# Patient Record
Sex: MALE | Race: WHITE | Employment: FULL TIME | URBAN - METROPOLITAN AREA
[De-identification: names, ages, dates, MRNs, and addresses within clinical notes are randomized per-mention and may not be internally consistent; named-entity substitution may affect disease eponyms.]

---

## 2021-03-06 ENCOUNTER — HOSPITAL ENCOUNTER (EMERGENCY)
Age: 21
Discharge: HOME | End: 2021-03-06
Attending: EMERGENCY MEDICINE
Payer: MEDICAID

## 2021-03-06 VITALS
HEIGHT: 68 IN | DIASTOLIC BLOOD PRESSURE: 80 MMHG | HEART RATE: 86 BPM | BODY MASS INDEX: 22.73 KG/M2 | SYSTOLIC BLOOD PRESSURE: 132 MMHG | TEMPERATURE: 97.8 F | RESPIRATION RATE: 15 BRPM | OXYGEN SATURATION: 97 % | WEIGHT: 150 LBS

## 2021-03-06 DIAGNOSIS — F43.20 ADJUSTMENT DISORDER, UNSPECIFIED TYPE: Primary | ICD-10-CM

## 2021-03-06 PROCEDURE — 99285 EMERGENCY DEPT VISIT HI MDM: CPT

## 2021-03-06 ASSESSMENT — ENCOUNTER SYMPTOMS
ABDOMINAL PAIN: 0
EYE DISCHARGE: 0
RHINORRHEA: 0
VOMITING: 0
BACK PAIN: 0
NAUSEA: 0
EYE PAIN: 0
SHORTNESS OF BREATH: 0
COUGH: 0
SORE THROAT: 0

## 2021-03-06 NOTE — ED PROVIDER NOTES
[address] ENCOUNTER      Pt Name: Azalea James  MRN: [id number]  [place] 2000  Date of evaluation: 3/6/2021  Provider: Sonu Villanueva MD    CHIEF COMPLAINT       Chief Complaint   Patient presents with   [address] Problem    Suicidal         HISTORY OF PRESENT ILLNESS      Philippe Cee is a 24 y.o. male who presents to the emergency department  for   Chief Complaint   Patient presents with   [address] Problem    Suicidal       27-year-old male presents for evaluation after having made concerning statements to police. He endorses that he drinks several alcoholic drinks around 6 or 7 PM.  He reports that he was driving home when he experienced some car trouble so pulled over to the side of the road. He states that police then pulled over and he was being cited for driving while intoxicated. He states that he did make statements about wanting to harm himself. He reports that he said these to avoid going to custodial. He was brought to the emergency department a pink slip. According to the pink slip, patient was detained by police due to concerns for them. Driving and while with police he made statements about wanting to harm himself and that he had an unstable home life. There is no report that he taken any actions with self-harm. In the emergency department, he currently denies any suicidal ideation homicidality or lethality. He denies any auditory visual loose Nations. He denies any history of mood disorder. Denies any history of suicide attempt. He states that he simply said these things in order to avoid going to custodial. He has no external signs of trauma. Denies any somatic complaints. He is alert and oriented answering questions appropriately. GCS of 15. Is moving all extremities spontaneously. Nursing Notes, Triage Notes & Vital Signs were reviewed.       REVIEW OF SYSTEMS    (2-9 systems for level 4, 10 or more for level 5)     Review of Systems   Constitutional: Negative for chills and fever. HENT: Negative for congestion, rhinorrhea and sore throat. Eyes: Negative for pain and discharge. Respiratory: Negative for cough and shortness of breath. Cardiovascular: Negative for chest pain and palpitations. Gastrointestinal: Negative for abdominal pain, nausea and vomiting. Endocrine: Negative for polydipsia and polyuria. Genitourinary: Negative for dysuria and flank pain. Musculoskeletal: Negative for back pain and neck pain. Skin: Negative for pallor and wound. Neurological: Negative for dizziness, facial asymmetry, light-headedness, numbness and headaches. Psychiatric/Behavioral: Negative for confusion. Except as noted above the remainder of the review of systems was reviewed and negative. PAST MEDICAL HISTORY   History reviewed. No pertinent past medical history. Prior to Admission medications    Medication Sig Start Date End Date Taking? Authorizing Provider   ibuprofen (ADVIL;MOTRIN) 600 MG tablet Take 1 tablet by mouth every 6 hours as needed for Pain 10/12/15   JOSEPHINE Duong CNP        There is no problem list on file for this patient. SURGICAL HISTORY       Past Surgical History:   Procedure Laterality Date    EYE SURGERY      HERNIA REPAIR           CURRENT MEDICATIONS       Discharge Medication List as of 3/6/2021  7:44 AM      CONTINUE these medications which have NOT CHANGED    Details   ibuprofen (ADVIL;MOTRIN) 600 MG tablet Take 1 tablet by mouth every 6 hours as needed for Pain, Disp-30 tablet, R-0             ALLERGIES     Patient has no known allergies. FAMILY HISTORY     History reviewed. No pertinent family history.        SOCIAL HISTORY       Social History     Socioeconomic History    Marital status: Single     Spouse name: None    Number of children: None    Years of education: None    Highest education level: None   Occupational History    None   Social Needs    Financial resource strain: None    Food insecurity     Worry: None     Inability: None    Transportation needs     Medical: None     Non-medical: None   Tobacco Use    Smoking status: Never Smoker    Smokeless tobacco: Never Used   Substance and Sexual Activity    Alcohol use: Yes    Drug use: No    Sexual activity: None   Lifestyle    Physical activity     Days per week: None     Minutes per session: None    Stress: None   Relationships    Social connections     Talks on phone: None     Gets together: None     Attends Zoroastrianism service: None     Active member of club or organization: None     Attends meetings of clubs or organizations: None     Relationship status: None    Intimate partner violence     Fear of current or ex partner: None     Emotionally abused: None     Physically abused: None     Forced sexual activity: None   Other Topics Concern    None   Social History Narrative    None       SCREENINGS    Hugo Coma Scale  Eye Opening: Spontaneous  Best Verbal Response: Oriented  Best Motor Response: Obeys commands  [place] Coma Scale Score: 15          PHYSICAL EXAM    (up to 7 for level 4, 8 or more for level 5)     ED Triage Vitals [03/06/21 0527]   BP Temp Temp Source Pulse Resp SpO2 Height Weight   132/80 97.8 °F (36.6 °C) Oral 86 15 92 % 5' 8\" (1.727 m) 150 lb (68 kg)       Physical Exam  Vitals signs reviewed. Constitutional:       Appearance: He is not ill-appearing or toxic-appearing. HENT:      Head: Normocephalic and atraumatic. Nose: No congestion or rhinorrhea. Mouth/Throat:      Mouth: Mucous membranes are moist.      Pharynx: No oropharyngeal exudate or posterior oropharyngeal erythema. Eyes:      General:         Right eye: No discharge. Left eye: No discharge. Extraocular Movements: Extraocular movements intact. Pupils: Pupils are equal, round, and reactive to light.    Neck: Musculoskeletal: Normal range of motion and neck supple. No muscular tenderness. Cardiovascular:      Rate and Rhythm: Normal rate. Heart sounds: No friction rub. No gallop. Pulmonary:      Effort: Pulmonary effort is normal. No respiratory distress. Comments: Respirations unlabored  No retractions, no increased work of breathing  Chest:      Chest wall: No tenderness. Abdominal:      Palpations: Abdomen is soft. Tenderness: There is no abdominal tenderness. There is no guarding. Comments: Abdomen soft, non-tender, non-peritoneal  No guarding on abdominal exam   Musculoskeletal:         General: No signs of injury. Right lower leg: No edema. Left lower leg: No edema. Skin:     General: Skin is warm. Capillary Refill: Capillary refill takes less than 2 seconds. Findings: No erythema, lesion or rash. Neurological:      General: No focal deficit present. Mental Status: He is alert and oriented to person, place, and time. DIAGNOSTIC RESULTS     Labs Reviewed - No data to display         RADIOLOGY:     Non-plain film images such as CT, Ultrasound and MRI are read by the radiologist. Plain radiographic images are visualized and preliminarily interpreted by the emergency physician. Interpretation per the Radiologist below, if available at the time of this note:    No orders to display         ED BEDSIDE ULTRASOUND:   Performed by ED Physician Tay Davis MD       LABS:  Labs Reviewed - No data to display    All other labs were within normal range or not returned as of this dictation.     EMERGENCY DEPARTMENT COURSE and DIFFERENTIAL DIAGNOSIS/MDM:   Vitals:    Vitals:    03/06/21 0527 03/06/21 0528   BP: 132/80    Pulse: 86    Resp: 15    Temp: 97.8 °F (36.6 °C)    TempSrc: Oral    SpO2: 92% 97%   Weight: 150 lb (68 kg)    Height: 5' 8\" (1.727 m)            MDM  Number of Diagnoses or Management Options  Adjustment disorder, unspecified type  Diagnosis management comments: 40-year-old male presents for evaluation after having made concerning statements to police. He did have some alcoholic beverages yesterday evening around 6 or 7 PM he reports. He states that he was driving and had some car trouble and was pulled over by police. He was being reportedly cited for an impaired driving violation when he made statements that he wanted to hurt himself or did not want a live and that he had unstable home life. He was brought to the emergency department on a pink slip. There is no report of any actions taken toward self-harm. In the emergency department, he vehemently denies any active suicidality, homicidality of the lethality. He reports that he made the statements because he was trying to avoid [facility]. He has no history of mood disorder. He is not on any psychotropic medications. No history of suicide attempts. He has no external signs of trauma. He is alert and oriented answering questions appropriately. He does appear to be clinically sober. His vitals unremarkable. Neurological and physical exams are nonacute. At this time, I do not see any significantly high risk aspects to patient's presentation. He has no history of suicide attempt. He is not taking actions with self-harm nor has any plan. I do believe that his statements were made in the context of trying to avoid going to [facility]. He is able to contract for safety at this time. He is counseled about avoiding such behaviors. He is able to find a sober ride. He is discharged with a sober ride. He will follow-up outpatient. Return precautions for worsening concerning symptoms are discussed. He is discharged in stable condition.          -  Patient seen and evaluated in the emergency department. -  Triage and nursing notes reviewed and incorporated. -  Old chart records reviewed and incorporated.   -  Work-up included:  See above  -  Results discussed with

## 2021-03-06 NOTE — ED NOTES
Report received from [facility] and care assumed at this time. Pt resting comfortably on cot. No needs expressed. Pt was given cell phone in order to obtain a ride home per Dr Juliette Johnson. Sitter at bedside.      Mac Pretty RN  03/06/21 5409

## 2021-03-06 NOTE — ED NOTES
Reviewed d/c instructions with pt and all questions addressed. Pt alert and oriented x4 at discharge. Pt seen getting into vehicle with brother.        Swetha Figueroa RN  03/06/21 9518

## 2021-03-06 NOTE — ED NOTES
Pt states his brother is currently on his way to  pt. Dr Rohit Auguste notified. Belongings given back to pt at this time in order to change.       Ronnell Briseno RN  03/06/21 5571

## 2021-03-06 NOTE — ED NOTES
Pt repeatedly stating \"this is ridiculous\" and \"my dad can just come pick me up right now\" This tech informed pt once again that he cannot leave due to pink slip.       Britany Hughes  03/06/21 4120

## 2022-01-01 ENCOUNTER — HOSPITAL ENCOUNTER (EMERGENCY)
Age: 22
Discharge: HOME | End: 2022-01-01
Attending: EMERGENCY MEDICINE
Payer: MEDICAID

## 2022-01-01 VITALS
WEIGHT: 150 LBS | TEMPERATURE: 97.5 F | HEART RATE: 80 BPM | RESPIRATION RATE: 16 BRPM | HEIGHT: 68 IN | OXYGEN SATURATION: 96 % | BODY MASS INDEX: 22.73 KG/M2 | SYSTOLIC BLOOD PRESSURE: 138 MMHG | DIASTOLIC BLOOD PRESSURE: 89 MMHG

## 2022-01-01 DIAGNOSIS — F10.920 ACUTE ALCOHOLIC INTOXICATION WITHOUT COMPLICATION (HCC): Primary | ICD-10-CM

## 2022-01-01 PROCEDURE — 99284 EMERGENCY DEPT VISIT MOD MDM: CPT

## 2022-01-01 NOTE — ED NOTES
Pt attempted to call Dad for a ride. Refusing to allow blood draw or to provide urine specimen.       Lexis Chirinos RN  01/01/22 9550

## 2022-01-01 NOTE — ED NOTES
Discharge instructions given while pt walking out door. Dad in car.       Flakita Garcia RN  01/01/22 8591

## 2022-01-01 NOTE — ED NOTES
Pt's dad called back will be here to  rosanne erickson [phone]     Tyree Hercules, ANN-MARIE  01/01/22 1547

## 2022-04-13 ENCOUNTER — HOSPITAL ENCOUNTER (OUTPATIENT)
Dept: PSYCHIATRY | Age: 22
Setting detail: THERAPIES SERIES
Discharge: HOME | End: 2022-04-13
Payer: MEDICAID

## 2022-04-13 PROCEDURE — 80305 DRUG TEST PRSMV DIR OPT OBS: CPT

## 2022-04-13 PROCEDURE — 90791 PSYCH DIAGNOSTIC EVALUATION: CPT

## 2022-04-13 ASSESSMENT — ANXIETY QUESTIONNAIRES
3. WORRYING TOO MUCH ABOUT DIFFERENT THINGS: 0
6. BECOMING EASILY ANNOYED OR IRRITABLE: 0
7. FEELING AFRAID AS IF SOMETHING AWFUL MIGHT HAPPEN: 1
IF YOU CHECKED OFF ANY PROBLEMS ON THIS QUESTIONNAIRE, HOW DIFFICULT HAVE THESE PROBLEMS MADE IT FOR YOU TO DO YOUR WORK, TAKE CARE OF THINGS AT HOME, OR GET ALONG WITH OTHER PEOPLE: SOMEWHAT DIFFICULT
6. BECOMING EASILY ANNOYED OR IRRITABLE: 0
1. FEELING NERVOUS, ANXIOUS, OR ON EDGE: 1
4. TROUBLE RELAXING: 0
7. FEELING AFRAID AS IF SOMETHING AWFUL MIGHT HAPPEN: 1
GAD7 TOTAL SCORE: 3
2. NOT BEING ABLE TO STOP OR CONTROL WORRYING: 1
2. NOT BEING ABLE TO STOP OR CONTROL WORRYING: 1
GAD7 TOTAL SCORE: 3
3. WORRYING TOO MUCH ABOUT DIFFERENT THINGS: 0
5. BEING SO RESTLESS THAT IT IS HARD TO SIT STILL: 0
5. BEING SO RESTLESS THAT IT IS HARD TO SIT STILL: 0
4. TROUBLE RELAXING: 0
1. FEELING NERVOUS, ANXIOUS, OR ON EDGE: 1
IF YOU CHECKED OFF ANY PROBLEMS ON THIS QUESTIONNAIRE, HOW DIFFICULT HAVE THESE PROBLEMS MADE IT FOR YOU TO DO YOUR WORK, TAKE CARE OF THINGS AT HOME, OR GET ALONG WITH OTHER PEOPLE: SOMEWHAT DIFFICULT

## 2022-04-13 ASSESSMENT — LIFESTYLE VARIABLES: HISTORY_ALCOHOL_USE: YES

## 2022-04-13 NOTE — PROGRESS NOTES
[facility]                Progress Note    [] Igor  [] Jordyn sorto                    Patient Name: Katlyn Doherty   : 2000     Case # :  [id number]  Therapist: Zelalem Dimas [facility]        Objective/Service/Time:    Asmt 1 Hour UDS . 28  S- Client shared legal, use, and family histories. See in [address] Munoz Denies any MH, oriented and cooperated with the process, Client understands treatment program  A- Client and this writer completed assessment, LYNN and UDS  P- Client to attend treatment planning next week and Thursday am group next week.                   Zelalem Dimas MA, LPC, LSW, MAC 22, 2:57 PM

## 2022-04-13 NOTE — PROGRESS NOTES
[facility]                     CLINICAL DIAGNOSIS SUMMARY    Location: [] [place] [x] Sindy Colon                   Patient Name: Dagoberto Snyder   : 2000     Case # :  [id number]  Therapist: Ximena Bergman LPC      1. Identifying information:  Dagoberto Snyder / 2000         WSM resides with father, Step Mother and Brother who has Mental Illness,  Client works FT [employer]    2. Substance use history:  F10.20 Other and unspecified alcohol dependence/unspecified drinking behavior       Abuse of alcohol age 25 4 to 5 beers  Last use 2022, no withdraws    3. Consequences of substance use: (personal, inter-personal, legal, occupational, medical, nutritional,       Leisure, spiritual, etc.)               Davee Locket, ,   Physical Control, 2022, [place],  Probation Violation 2022 [address]    4. Co-existing problems;  (mental health, psychiatric, previous treatment programs, family       Problems, social, educational, etc.)       Denies any MH,  Estranged mother has abused drugs, Older brother Severe Mental Illness    5. Treatment needs, barriers to treatment, impact of disease on life:      Transportation    Summary of Medical History:  Prior to Admission medications    Medication Sig Start Date End Date Taking? Authorizing Provider   ibuprofen (ADVIL;MOTRIN) 600 MG tablet Take 1 tablet by mouth every 6 hours as needed for Pain 10/12/15   JOSEPHINE Perez - CNP     Past Surgical History:   Procedure Laterality Date    EYE SURGERY      HERNIA REPAIR       History reviewed. No pertinent past medical history. There are no problems to display for this patient. 6. Level of Care Determination:      1 Outpatient Services   7. Treatment available      _x___yes   _____no         8.   Name of program referred:    ___x_[facility],    ____[facility],       _______other/identify     Electronically signed by Ximena Bergman LPC on 2022 at 2:22 PM

## 2022-04-13 NOTE — PROGRESS NOTES
[address] [place] [address] Urinalysis Laboratory Testing and Medical History Physician Order       Location: [] [place] [x] Galina Alvarez MD., [address], Medical Director of [facility] SURGICAL [facility] Director orders for [address] [place] [address] clinical therapists to collect an urine sample from the below patient,  and medical order for placement in level of care documented on [facility] 157 scanned order. Client: Dagoberto Snyder   : 2000   Case# [id number]    Urine sample will be collected following the collections guidelines provided on Clinical Reference Laboratory [facility] AT [facility] custody form, and completion of the [facility] Non-Federal chain of custody drug screening form. During the course of treatment, randomly a urine sample will be collected, at a minimum of one time a month, more frequently as needed, as part of the clinical outpatient alcohol and drug treatment program at [address] [place] [address]. Medical care recommendation for clients experiencing/reporting  medical concerns, who do not have a family physician and willing to attend  medical care will be assisted in seeking medical care. Clinical providers will refer clients to local family physicians practices as part of the  clients treatment plan and assist the client in gaining access to an appointment. Release of information will be requested to support the  clients seeking medical care. Summary of Medical History  Prior to Admission medications    Medication Sig Start Date End Date Taking? Authorizing Provider   ibuprofen (ADVIL;MOTRIN) 600 MG tablet Take 1 tablet by mouth every 6 hours as needed for Pain 10/12/15   JOSEHPINE Perez - CNP     Past Surgical History:   Procedure Laterality Date    EYE SURGERY      HERNIA REPAIR       History reviewed. No pertinent past medical history. There are no problems to display for this patient.       Ximena Bergman [facility]  2022/2:00 PM

## 2022-04-20 ENCOUNTER — HOSPITAL ENCOUNTER (OUTPATIENT)
Dept: PSYCHIATRY | Age: 22
Setting detail: THERAPIES SERIES
Discharge: HOME | End: 2022-04-20
Payer: MEDICAID

## 2022-04-20 PROCEDURE — 90834 PSYTX W PT 45 MINUTES: CPT

## 2022-04-20 NOTE — PROGRESS NOTES
[address] [facility] TREATMENT PLAN      Location: [] [place] [x] Jordyn sorto    Treatment plan: Initial    Strengths: family and work focused    Weakness/Limitations: legal and abusing alcohol    Service/Frequency/Duration: Wkly IT and GT, Random UDS    Diagnosis: F10.20 Other and unspecified alcohol dependence/unspecified drinking behavior    Level of Care: 1 Outpatient Services    1. Problem: Client abuse of Alcohol SHE[place] in 2020, Physical Control in 2022, abuses 4 to 5 beers at a time for 4 years. a. Goal: Maintain Sober living in 90 days  b. Objectives:   i. 1) Client to identify 2 triggers and cravings of use, complete treatment booklet in 90 days Evaluation Date: 7/20/22 Code: C Continue TBD  ii. 2) Client participate in positive peer activity 1 x weekly, [facility] Evaluation Date: 7/20/22Code: C Continue TBD      2. Problem: Client coping with stressors such as brother's Mental illness  a. Goal: Develop a coping plan in 90 days  b. Objectives:   i. 1) Learn and discuss 4 coping skills in 90 days  Evaluation Date: 7/20/22Code: C Continue TBD   ii. 2) Discuss resources, online such as AMI in 90 days  Evaluation Date:7/20/22Code: C Continue TBD       3. Problem: Current probation [address] for Physical Control and Probation Violation  a. Goal: Meet the expectations of the Court and Probation Officier in 90 days   b. Objectives:   i. 1) Attend all meetings, including drug court and pay all sanctions in 90 days  Evaluation Date: 7/20/22 Code: C Continue TBD      Defer:explore educational and vocational opportunites    Discharge Plan/Instructions: Maintain sober living, occupation and use coping skills manage family issues    Philippe Goldstein / 2000 has participated in the treatment plan development outlined above on 4/20/2022.      Ximena Bergman LPC  4/20/2022/10:18 AM

## 2022-04-21 ENCOUNTER — HOSPITAL ENCOUNTER (OUTPATIENT)
Dept: PSYCHIATRY | Age: 22
Setting detail: THERAPIES SERIES
Discharge: HOME | End: 2022-04-21
Payer: MEDICAID

## 2022-04-21 PROCEDURE — 90853 GROUP PSYCHOTHERAPY: CPT

## 2022-04-21 NOTE — GROUP NOTE
[address] [facility] Group Therapy Note      4/21/2022    Location:  ID90T    Clients Presents: [phone]     Clients Absent: [phone]    Length of session: 1.5 hours    Group Note: OP    Group Type: Co-Ed    New members were welcomed and introduced. Norms and expectations of group were discussed. Content: Client's discussed corrective and alternative thinking and communication as a sober person. Client's discussed Princess Long inspirational talk. Alec Hanson  4/21/2022 12:01 PM    Co-Therapist: N/A      [facility] Individual Group Progress Note    Philippe Goldstein  2000 4/21/2022    Notes on Client Progress in Group    Client shared about family and empowerment in his initial group, denies abusing alcohol currently.      Alec Hanson  4/21/2022 12:04 PM    Co-Therapist: N/A

## 2022-04-27 ENCOUNTER — HOSPITAL ENCOUNTER (OUTPATIENT)
Dept: PSYCHIATRY | Age: 22
Setting detail: THERAPIES SERIES
Discharge: HOME | End: 2022-04-27
Payer: MEDICAID

## 2022-04-27 PROCEDURE — 90834 PSYTX W PT 45 MINUTES: CPT

## 2022-04-27 NOTE — PROGRESS NOTES
[facility]                Progress Note    [] Igor  [x] Christi Wright                    Patient Name: Gino Chapman   : 2000     Case # :  [id number]  Therapist: Clair Be [facility]        Objective/Service/Time:    IT 1    Topic:self will vs surrendering  treatment book  S- Client reports, exploring jobs, working making pizza, going to drug court and writing letters to his brother. O-Client reports, motivation, setting goals, full range affect      A-Client began discussion on First Step Powerlessness vs self will        P-Client attends [employer], work, drug court and has supportive family.                   Clari Be MA, LPC, LSW, MAC 22, 2:01 PM

## 2022-04-28 ENCOUNTER — HOSPITAL ENCOUNTER (OUTPATIENT)
Dept: PSYCHIATRY | Age: 22
Setting detail: THERAPIES SERIES
Discharge: HOME | End: 2022-04-28
Payer: MEDICAID

## 2022-04-28 PROCEDURE — 90853 GROUP PSYCHOTHERAPY: CPT

## 2022-04-28 NOTE — GROUP NOTE
[address] [facility] Group Therapy Note      4/28/2022    Location:  [place]    Clients Presents: [phone]    Clients Absent: [phone]    Length of session: 1.5 hours    Group Note: Alexia Ramsay    Group Type: Coed    New members were welcomed and introduced. Norms and expectations of group were discussed. Content: Client discussed motivation, failing forward, lessons learned in Recovery. Clients discussed Unmanageability. Max Mendoza [facility]  4/28/2022 11:53 AM    Co-Therapist:       [facility] Individual Group Progress Note    Philippe Goldstein  2000 4/28/2022    Notes on Client Progress in Group    Client shared about his life of drinking and no direction, currently explore goals and working out.     Max Mendoza [facility]  4/28/2022 11:58 AM    Co-Therapist: N/A

## 2022-05-04 ENCOUNTER — HOSPITAL ENCOUNTER (OUTPATIENT)
Dept: PSYCHIATRY | Age: 22
Setting detail: THERAPIES SERIES
Discharge: HOME | End: 2022-05-04
Payer: MEDICAID

## 2022-05-04 PROCEDURE — 90834 PSYTX W PT 45 MINUTES: CPT

## 2022-05-04 NOTE — PROGRESS NOTES
[facility]                Progress Note    [] Igor  [x] Jordyn sorto                    Patient Name: Raymond Pedersen   : 2000     Case # :  [id number]  Therapist: Alec Morejon        Objective/Service/Time:    IT-   1.0  Topic: First Step Book:    PP 6-9 Image of Powerlessness, facts about abusing, Thinking about drinking   S- Client continues in drug court weekly, working 6 days, and hoping to find another job that will compliment it. 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 O- Client motivated, anxious to get driving privileges  A- Discussed and brainstorming avocational opportunities, Discussed First Step and discussed application to his abuse and current sober living. P- Apply skills learned, Attends Drug Court, works daily, and attends [facility] [employer] [facility].                        Kaylen Morel MA, [place], LSW, MAC 22, 1:48 PM

## 2022-05-05 ENCOUNTER — HOSPITAL ENCOUNTER (OUTPATIENT)
Dept: PSYCHIATRY | Age: 22
Setting detail: THERAPIES SERIES
Discharge: HOME | End: 2022-05-05
Payer: MEDICAID

## 2022-05-05 PROCEDURE — 90853 GROUP PSYCHOTHERAPY: CPT

## 2022-05-05 NOTE — GROUP NOTE
[address] [facility] Group Therapy Note      5/5/2022    Location:  [place]    Clients Presents: [phone]    Clients Absent: [phone]    Length of session: 1.5 hours    Group Note: OP    Group Type: Co-Ed    New members were welcomed and introduced. Norms and expectations of group were discussed. Content: Client's discussed SUDS (decisions to stay sober),  Protective Factors (coping) and Lessons learn from [facility]  5/5/2022 11:39 AM    Co-Therapist: N/A      [facility] Individual Group Progress Note    Philippe Goldstein  2000 5/5/2022    Notes on Client Progress in Group    Client looking for a complimentary job, family focused, staying sober and communicating & admiring brother who is in Basic training.     [facility]  5/5/2022 11:59 AM    Co-Therapist: N/A

## 2022-05-11 ENCOUNTER — HOSPITAL ENCOUNTER (OUTPATIENT)
Dept: PSYCHIATRY | Age: 22
Setting detail: THERAPIES SERIES
Discharge: HOME | End: 2022-05-11
Payer: MEDICAID

## 2022-05-11 PROCEDURE — 90834 PSYTX W PT 45 MINUTES: CPT

## 2022-05-11 PROCEDURE — 80305 DRUG TEST PRSMV DIR OPT OBS: CPT

## 2022-05-11 NOTE — PROGRESS NOTES
[facility]                Progress Note    [] [place]  [x] [facility]                    Patient Name: Kanika Bryant   : 2000     Case # :  [id number]  [facility]        Objective/Service/Time:    IT-1.0   UDS .35  First Step-trying to control or cut down, 3/1 Had court yesterday case in [address] [place] [address] reports, working, staying focused, supporting father with help, drug court and attending hearing for his [address] in Feb.  Linda Ee- concerned about his hearing, focused, cooperative, denies use  A- Client and this writer spoke about [address] in Feb, client wanting to know a 5 day inpatient program. Client denies a need for higher level care, this writer advised, per his focus on our program, Client does not meet the criteria. Client discussed losses with this writer in First Step treatment book. P- Client attends drug court, GRP, IT, works and helps his father.                   Max Mendoza MA, LPC, LSW, MAC 22, 1:48 PM

## 2022-05-12 ENCOUNTER — HOSPITAL ENCOUNTER (OUTPATIENT)
Dept: PSYCHIATRY | Age: 22
Setting detail: THERAPIES SERIES
Discharge: HOME | End: 2022-05-12
Payer: MEDICAID

## 2022-05-12 PROCEDURE — 90853 GROUP PSYCHOTHERAPY: CPT

## 2022-05-12 NOTE — GROUP NOTE
[address] [facility] Group Therapy Note      5/12/2022    Location:  [employer]    Clients Presents: [phone]    Clients Absent: [phone]     Length of session: 1.5 hours    Group Note: OP    Group Type: Co-Ed    New members were welcomed and introduced. Norms and expectations of group were discussed. Content: Client's discussed logical, Son and Emotional mind in relationship to sober living and conflicts/stressors. [facility]  5/12/2022 11:32 AM    Co-Therapist: N/A      [facility] Individual Group Progress Note    Philippe Goldstein  2000 5/12/2022    Notes on Client Progress in Group    Client reports working through shame of his offenses, and current legal stressors. Client was focused and denies current use.     [facility]  5/12/2022 11:35 AM    Co-Therapist: N/A

## 2022-05-18 ENCOUNTER — HOSPITAL ENCOUNTER (OUTPATIENT)
Dept: PSYCHIATRY | Age: 22
Setting detail: THERAPIES SERIES
Discharge: HOME | End: 2022-05-18
Payer: MEDICAID

## 2022-05-18 PROCEDURE — 90834 PSYTX W PT 45 MINUTES: CPT

## 2022-05-18 NOTE — PROGRESS NOTES
[facility] JARED                Progress Note    [] Igor  [x] Jordyn sorto                    Patient Name: Jailene Newman   : 2000     Case # :  [id number]  Therapist: Alec Taveras        Objective/Service/Time:    IT 1.0 Topic: Coping with Procrastination when abusing alcohol, Damage to the Body   S- Client reports,  [facility]. Client has court in [place] on Tuesday. Client continues to work and [employer] 6 days a week. Client continues in Drug Court. O- Client motivated, focused and willingly per his report  A- Client and this writer discussed court hearing and options for 6 day intervention. Client and this writer discussed Damage to the Body,  Abusing alcohol. P- Client will attend GT, Client participates in Drug Court and Work. Client encouraged to work out. Client utilizing legal supports.                   Evelyn Hatch MA, [place], LSW, MAC 22, 1:47 PM

## 2022-05-19 ENCOUNTER — HOSPITAL ENCOUNTER (OUTPATIENT)
Dept: PSYCHIATRY | Age: 22
Setting detail: THERAPIES SERIES
Discharge: HOME | End: 2022-05-19
Payer: MEDICAID

## 2022-05-19 PROCEDURE — 90853 GROUP PSYCHOTHERAPY: CPT

## 2022-05-19 NOTE — GROUP NOTE
612 [facility] Group Therapy Note      5/19/2022    Location:  [employer]    Clients Presents: [phone]    Clients Absent:     Length of session: 1.5 hours    Group Note: OP    Group Type: Co-Ed    New members were welcomed and introduced. Norms and expectations of group were discussed. Content: Client's discussed Micro Habits, change and watched and discussed motivational illustration on [employer] (courage to be sober and setting boundaries & cognizant of people places and things    Tanmay [facility]  5/19/2022 11:27 AM    Co-Therapist: N/A      [facility] Group Progress Note    Philippe Goldstein  2000 5/19/2022    Notes on Client Progress in Group    Client continues to be motivated and setting goals. Client wants to get driving privileges. Client hopes to attend brothers graduation in [place]. Client denies use.   [facility]  5/19/2022 11:30 AM    Co-Therapist: N/A

## 2022-05-25 ENCOUNTER — HOSPITAL ENCOUNTER (OUTPATIENT)
Dept: PSYCHIATRY | Age: 22
Setting detail: THERAPIES SERIES
Discharge: HOME | End: 2022-05-25
Payer: MEDICAID

## 2022-05-25 PROCEDURE — 90834 PSYTX W PT 45 MINUTES: CPT

## 2022-05-25 NOTE — PROGRESS NOTES
[facility]                Progress Note    [] [place]  [x] Jordyn sorto                    Patient Name: Diamante Johnson   : 2000     Case # :  [id number]  Therapist: Alec Hanson        Objective/Service/Time:    IT 1.0  Topic: 3/1 Legal  S- Client was sentenced in [address] (),  Sentenced to GPS 15 days,  6 day AIP program, 5 years DL suspension, 1 year probation and complete [employer]. Client has 700 fine for [place]. O- Client motivated,  Concerned and engaged  A- Client planning to go to [place] next Week. Client processing issues with sentencing, work future and suspended license. Client motivated and using micro-actions, planning to pay fines. Client will clarify with court if he can travel with court GPS. P- IT, GT, Client travelling next week to [place], cancelling appointments.                   Madhavi Pedraza MA, [place], LSW, MAC 22, 2:02 PM

## 2022-05-26 ENCOUNTER — HOSPITAL ENCOUNTER (OUTPATIENT)
Dept: PSYCHIATRY | Age: 22
Setting detail: THERAPIES SERIES
Discharge: HOME | End: 2022-05-26
Payer: MEDICAID

## 2022-05-26 PROCEDURE — 90853 GROUP PSYCHOTHERAPY: CPT

## 2022-05-26 NOTE — GROUP NOTE
612 [facility] Group Therapy Note      5/26/2022    Location:  [email or web address]    Clients Presents: [phone]    Clients Absent:     Length of session: 1.5 hours    Group Note: OP    Group Type: Co-Ed    New members were welcomed and introduced. Norms and expectations of group were discussed. Content: Clients shared their substance use stories, discussed CBT tool & strategies for distressing times and discussed Father Nati Brunner Step 1 presentation. [facility]  5/26/2022 11:27 AM    Co-Therapist: N/A      [facility] Progress Note    Philippe Goldstein  2000 5/26/2022    Notes on Client Progress in Group    Client share about abusing alcohol and legal problems. Client maintain sober living on house arrest, has permission to travel to brother's graduation from [employer]. Client denies use.     [facility]  5/26/2022 11:29 AM    Co-Therapist: N/A

## 2022-06-02 ENCOUNTER — HOSPITAL ENCOUNTER (OUTPATIENT)
Dept: PSYCHIATRY | Age: 22
Setting detail: THERAPIES SERIES
Discharge: HOME | End: 2022-06-02
Payer: MEDICAID

## 2022-06-02 NOTE — GROUP NOTE
612 [facility] Group Therapy Note      6/2/2022    Location:  [place]    Clients Presents: [phone]    Clients Absent: [phone]    Length of session: 1.5 hours    Group Note: OP    Group Type: Co-Ed    New members were welcomed and introduced. Norms and expectations of group were discussed.     Content: Client's participated and reviewed the new Check in, Clients learn coping tips from Smart Recovery and CBT on Anxiety and coping    [facility]  6/2/2022 11:25 AM    Co-Therapist: N/A      [facility] Progress Note    Philippe Goldstein  2000 6/2/2022    Notes on Client Progress in Group    Reason for Absence: Cxl in 911 [facility]  6/2/2022 11:28 AM    Co-Therapist: N/A

## 2022-06-08 ENCOUNTER — HOSPITAL ENCOUNTER (OUTPATIENT)
Dept: PSYCHIATRY | Age: 22
Setting detail: THERAPIES SERIES
Discharge: HOME | End: 2022-06-08
Payer: MEDICAID

## 2022-06-08 PROCEDURE — 90834 PSYTX W PT 45 MINUTES: CPT

## 2022-06-08 NOTE — PROGRESS NOTES
[facility] JARED                Progress Note    [] [place]  [x] Jordyn sorto                    Patient Name: Gregory Arauz   : 2000     Case # : [id number]  Therapist: Beatris Ash [facility]        Objective/Service/Time:    IT 1.0           Topic: treatment book 3/1 completed house arrest  S- Client was not able to attend [employer] graduation of brothers due to restrictions from house arrest.  Client verbalized disappointment. Client continues drug court, working with Leonardo Li and working with his father. Client completed house arrest with no incidents. O- Client reports disappointment, motivation, needing to schedule 6 day intervention course  A- Client processed disappointments, Client shared information from his treatment book on areas of unamanageability. P- client attends Drug Court, has probation, works, and hopes to work more with his father.                   Beatris Ash MA, [place], LSW, MAC 22, 2:02 PM

## 2022-06-09 ENCOUNTER — HOSPITAL ENCOUNTER (OUTPATIENT)
Dept: PSYCHIATRY | Age: 22
Setting detail: THERAPIES SERIES
Discharge: HOME | End: 2022-06-09
Payer: MEDICAID

## 2022-06-09 PROCEDURE — 90853 GROUP PSYCHOTHERAPY: CPT

## 2022-06-09 NOTE — GROUP NOTE
612 [facility] Group Therapy Note      6/9/2022    Location:  [place]    Clients Presents: [phone]    Clients Absent:     Length of session: 1.5 hours    Group Note: OP    Group Type: Co-Ed    New members were welcomed and introduced. Norms and expectations of group were discussed. Content: Client's discussed coping with triggers and urges, Clients discussed Transformational Change. Clients reviewed group goals and new check in. [facility]  6/9/2022 12:00 PM    Co-Therapist: N/A      [facility] Progress Note    Philippe Goldstein  2000 6/9/2022    Notes on Client Progress in Group    Client shared about coping with anger, his sober living and diversions.     [facility]  6/9/2022 12:03 PM    Co-Therapist: N/A

## 2022-06-15 ENCOUNTER — HOSPITAL ENCOUNTER (OUTPATIENT)
Dept: PSYCHIATRY | Age: 22
Setting detail: THERAPIES SERIES
Discharge: HOME | End: 2022-06-15
Payer: MEDICAID

## 2022-06-15 PROCEDURE — 80305 DRUG TEST PRSMV DIR OPT OBS: CPT

## 2022-06-15 PROCEDURE — 90834 PSYTX W PT 45 MINUTES: CPT

## 2022-06-15 NOTE — PROGRESS NOTES
[facility]                Progress Note    [] Igor  [x] Eunice Gutierrez                    Patient Name: Yuridia Heading   : 2000     Case # : [id number]  The[facility]        Objective/Service/Time:    IT- I.0   UDS . 28 Topic: completed treatment book, first step  S- Client reports, working and family support. Client attends drug court. O- Client motivated, cooperative and denies use of alcohol  A- Discussed First Step on going against personal  Values, feelings of helplessness, belief in HP, and Continuing Recovery. Client reports awareness into his abuse of alcohol and it's outcomes. P- Client attends Drug Court, has a pending intervention Class and attends Reach IT and GT.                      Nita Lindo MA, [place], LSW, MAC 06/15/22, 2:01 PM

## 2022-06-16 ENCOUNTER — HOSPITAL ENCOUNTER (OUTPATIENT)
Dept: PSYCHIATRY | Age: 22
Setting detail: THERAPIES SERIES
Discharge: HOME | End: 2022-06-16
Payer: MEDICAID

## 2022-06-16 PROCEDURE — 90853 GROUP PSYCHOTHERAPY: CPT

## 2022-06-16 NOTE — GROUP NOTE
612 [facility] Group Therapy Note      6/16/2022    Location:  [email or web address]    Clients Presents: [phone]     Clients Absent:[phone]    Length of session: 1.5 hours    Group Note: OP    Group Type: Co-Ed    New members were welcomed and introduced. Norms and expectations of group were discussed. Content: Clients discussed new rules, CBT Disarming negative self talk and urges to use and discussed Neurotransmitters in relation to addiction. [facility]  6/16/2022 11:57 AM    Co-Therapist: N/A      [facility] Progress Note    Philippe Goldstein  2000 6/16/2022    Notes on Client Progress in Group    Client actively shared about his urges and reason why he abused and how he challenges urges today.     [facility]  6/16/2022 12:03 PM    Co-Therapist: N/A

## 2022-06-22 ENCOUNTER — HOSPITAL ENCOUNTER (OUTPATIENT)
Dept: PSYCHIATRY | Age: 22
Setting detail: THERAPIES SERIES
Discharge: HOME | End: 2022-06-22
Payer: MEDICAID

## 2022-06-22 PROCEDURE — 90834 PSYTX W PT 45 MINUTES: CPT

## 2022-06-22 NOTE — PROGRESS NOTES
[facility]                Progress Note    [] [place]  [x] Jordyn sorto                    Patient Name: Shital Koch   : 2000     Case # :  [id number]  Therapist: Alem Parra [facility]        Objective/Service/Time:    IT 1.0 Topic:  finding 6 day intervention 3/1 working with [address] [place] [address], [address] [place] [address] reports, difficulty finding 6 day DIP. 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 Client progressed in drug court. O- Aware of emotions, motivated, focused  A- Client and this writer discussed conflict management skills,  And researched alternative correctional programs meets expectation for Colt Acosta, 6 days. P- Client attend GT, IT weekly tentative completion in July, Client call [facility] and his PO. Client attends drug court every other week.                   Alem Parra MA, [place], LSW, MAC 22, 2:00 PM

## 2022-06-23 ENCOUNTER — HOSPITAL ENCOUNTER (OUTPATIENT)
Dept: PSYCHIATRY | Age: 22
Setting detail: THERAPIES SERIES
Discharge: HOME | End: 2022-06-23
Payer: MEDICAID

## 2022-06-23 PROCEDURE — 90853 GROUP PSYCHOTHERAPY: CPT

## 2022-06-23 NOTE — GROUP NOTE
612 [facility] Group Therapy Note      6/23/2022    Location:  [employer]    Clients Presents: [phone]    Clients Absent: [phone]    Length of session: 1.5 hours    Group Note: OP    Group Type: Co-Ed    New members were welcomed and introduced. Norms and expectations of group were discussed. Content: Client's discussed Above the Line thinking in relationship to Stinking Thinking and explored cravings & triggers in their use of drugs and alcohol    Claudean Mesa, [facility]  6/23/2022 12:00 PM    Co-Therapist: N/A      [facility] Progress Note    Phliippe Goldstein  2000 6/23/2022    Notes on Client Progress in Group  Client was attentive and shared freely, uses work and family. Client noted boredom was a trigger.     Claudean Mesa, [facility]  6/23/2022 12:04 PM    Co-Therapist: N/A

## 2022-06-30 ENCOUNTER — APPOINTMENT (OUTPATIENT)
Dept: PSYCHIATRY | Age: 22
End: 2022-06-30
Payer: MEDICAID

## 2022-06-30 ENCOUNTER — HOSPITAL ENCOUNTER (OUTPATIENT)
Dept: PSYCHIATRY | Age: 22
Setting detail: THERAPIES SERIES
Discharge: HOME | End: 2022-06-30
Payer: MEDICAID

## 2022-06-30 PROCEDURE — 90853 GROUP PSYCHOTHERAPY: CPT | Performed by: COUNSELOR
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
## 2022-07-06 ENCOUNTER — HOSPITAL ENCOUNTER (OUTPATIENT)
Dept: PSYCHIATRY | Age: 22
Setting detail: THERAPIES SERIES
Discharge: HOME | End: 2022-07-06
Payer: MEDICAID

## 2022-07-06 PROCEDURE — 90834 PSYTX W PT 45 MINUTES: CPT

## 2022-07-06 NOTE — PROGRESS NOTES
[facility]                Progress Note    [] [place]  [x] Jordyn sorto                    Patient Name: Moe Abrams   : 2000     Case # :  [id number]  Therapist: Enoc Reed [facility]        Objective/Service/Time:    Client reported in 1 Hour IT topic: compliance 3/1-2  Jimenez Porteous  Coping with Brother's Mental illness  S- Client reports his older brother currently having Mental Illness issues being evaluated. Client is always aware and concerned about his brother's behavior. Client has court next week, client signed LYNN for self so he can take his copies to the court. Client attempted to comply with their request of a 6 day DIP which there is only 3 day DIP. O- Aware of emotions, process well  A- Client wanted to work towards driving privileges so he can find a better job and move to his own place.   Client shares the frustration and powerlessness over his brother's MI.  P- Client received copies of progress reports to take to court with him, Client continues Drug Court, IT, and GT              Enoc Reed MA, [place], LSW, [facility] 22, 2:53 PM

## 2022-07-07 ENCOUNTER — HOSPITAL ENCOUNTER (OUTPATIENT)
Dept: PSYCHIATRY | Age: 22
Setting detail: THERAPIES SERIES
Discharge: HOME | End: 2022-07-07
Payer: MEDICAID

## 2022-07-07 PROCEDURE — 90853 GROUP PSYCHOTHERAPY: CPT

## 2022-07-07 NOTE — GROUP NOTE
612  Group Therapy Note      7/7/2022    Location:  [facility]    Clients Presents: [phone]    Clients Absent: [phone]    Length of session: 1.5 hours    Group Note: OP    Group Type: Co-Ed    New members were welcomed and introduced. Norms and expectations of group were discussed. Content: Clients discussed checked in, Protective Factors in sober living and discussed Jamal Kiss sober story. Raisa Santa [facility]  7/7/2022 11:59 AM    Co-Therapist: N/A      [facility] Progress Note    Philippe Goldstein  2000 7/7/2022    Notes on Client Progress in Group    Client cooperative, engaged in GT and focused on the agenda, denies use; admits needs to work on his sense of purpose.     Raisa Santa [facility]  7/7/2022 12:05 PM    Co-Therapist: N/A

## 2022-07-13 ENCOUNTER — HOSPITAL ENCOUNTER (OUTPATIENT)
Dept: PSYCHIATRY | Age: 22
Setting detail: THERAPIES SERIES
Discharge: HOME | End: 2022-07-13
Payer: MEDICAID

## 2022-07-13 PROCEDURE — 90834 PSYTX W PT 45 MINUTES: CPT

## 2022-07-13 NOTE — PROGRESS NOTES
[facility]                Progress Note    [] [place]  [x] Jordyn sorto                    Patient Name: Deshaun Gan   : 2000     Case # :  [id number]  [facility]        Objective/Service/Time:    IT 1.0  Topic:  Court update   Extended for 2 months,   Drug Court   S- Client reports,  Court review extended to Sept due to visiting . Client in drug court and working. Client working out at home. Client stated brother in inpatient due to his Mental Illness. O- Client oriented, focused, concerned about decisions  A- Client and this writer reviewed goals and discussed after care counselling till next hearing. P- Client attend GT, IT and drug court.                   Zuleyma Castro MA, [place], LSW, MAC 22, 2:16 PM

## 2022-07-14 ENCOUNTER — HOSPITAL ENCOUNTER (OUTPATIENT)
Dept: PSYCHIATRY | Age: 22
Setting detail: THERAPIES SERIES
Discharge: HOME | End: 2022-07-14
Payer: MEDICAID

## 2022-07-14 PROCEDURE — 90853 GROUP PSYCHOTHERAPY: CPT

## 2022-07-14 NOTE — GROUP NOTE
612 [facility] Group Therapy Note      7/14/2022    Location:  [facility]    Clients Presents: [phone]    Clients Absent:[phone]    Length of session: 1.5 hours    Group Note: OP    Group Type: Co-Ed    New members were welcomed and introduced. Norms and expectations of group were discussed. Content: Client's discussed their check in, [employer], AA and Virtues, The whole in the [place] [facility] story from [place]. [facility]  7/14/2022 11:54 AM    Co-Therapist: N/A      [facility] Progress Note    Philippe Goldstein  2000 7/14/2022    Notes on Client Progress in Group    Client attentive, focused, participates, denies use, Client shared his frustrations legal issues. Denies use.     [facility]  7/14/2022 11:59 AM    Co-Therapist: N/A

## 2022-07-20 ENCOUNTER — HOSPITAL ENCOUNTER (OUTPATIENT)
Dept: PSYCHIATRY | Age: 22
Setting detail: THERAPIES SERIES
Discharge: HOME | End: 2022-07-20
Payer: MEDICAID

## 2022-07-20 PROCEDURE — 80305 DRUG TEST PRSMV DIR OPT OBS: CPT

## 2022-07-20 PROCEDURE — 90834 PSYTX W PT 45 MINUTES: CPT

## 2022-07-20 NOTE — PROGRESS NOTES
[facility]                Progress Note    [] [place]  [x] Jordyn sorto                    Patient Name: Carrie Gordon   : 2000     Case # : [id number]  Therapist: Yun Goldman [facility]        Objective/Service/Time:    IT-1.0 UDS . 35  Topic: preparing for after care  and drug court   S- Client reports,   working, contemplating other jobs, attends drug court every other week, Client has review hearing in [employer] in Sept.  Client brainstorming jobs and motivations for his life. Client continual concerned for his brother who is back home from [employer] [facility]. Client proud of his brother in the [employer]. O-Client motivated, cooperative and focused on setting future goals. A-Client and this writer reviewed goals, and discussed final GT & Client will be involved after care after next week. P- Client attends drug court, working, working out, Reach GT on Thursday.                   Yun Goldman MA, [place], LSW, [facility] 22, 1:58 PM

## 2022-07-21 ENCOUNTER — HOSPITAL ENCOUNTER (OUTPATIENT)
Dept: PSYCHIATRY | Age: 22
Setting detail: THERAPIES SERIES
Discharge: HOME | End: 2022-07-21
Payer: MEDICAID

## 2022-07-21 PROCEDURE — 90853 GROUP PSYCHOTHERAPY: CPT

## 2022-07-21 NOTE — GROUP NOTE
[address] [facility] Group Therapy Note      7/21/2022    Location:  [employer]    Clients Presents: [phone]    Clients Absent: [phone]    Length of session: 1.5 hours    Group Note: OP    Group Type: Co-Ed    New members were welcomed and introduced. Norms and expectations of group were discussed. Content: Client's discussed their Core Beliefs vs Unhealthy thinking while abusing AOD. Clients discussed resilience, motivational responses to adversity and viewed & discussed Pedro Thurston story. Clients discussed relaxing and being energized by Music and non use positive activities and games. [facility] [place]  7/21/2022 12:00 PM    Co-Therapist: N/A      [facility] Progress Note    Philippe Goldstein  2000 7/21/2022    Notes on Client Progress in Group    Client completed GT, discussed his motivation and setting goals for education/career. Client full range response to GT.     [facility]as [facility] [place]  7/21/2022 12:06 PM    Co-Therapist: N/A

## 2022-07-27 ENCOUNTER — HOSPITAL ENCOUNTER (OUTPATIENT)
Dept: PSYCHIATRY | Age: 22
Setting detail: THERAPIES SERIES
Discharge: HOME | End: 2022-07-27
Payer: MEDICAID

## 2022-07-27 PROCEDURE — 90832 PSYTX W PT 30 MINUTES: CPT

## 2022-07-28 ENCOUNTER — APPOINTMENT (OUTPATIENT)
Dept: PSYCHIATRY | Age: 22
End: 2022-07-28
Payer: MEDICAID

## 2022-08-03 ENCOUNTER — HOSPITAL ENCOUNTER (OUTPATIENT)
Dept: PSYCHIATRY | Age: 22
Setting detail: THERAPIES SERIES
Discharge: HOME | End: 2022-08-03
Payer: MEDICAID

## 2022-08-04 ENCOUNTER — APPOINTMENT (OUTPATIENT)
Dept: PSYCHIATRY | Age: 22
End: 2022-08-04
Payer: MEDICAID

## 2022-08-10 ENCOUNTER — HOSPITAL ENCOUNTER (OUTPATIENT)
Dept: PSYCHIATRY | Age: 22
Setting detail: THERAPIES SERIES
Discharge: HOME | End: 2022-08-10
Payer: MEDICAID

## 2022-08-10 PROCEDURE — 90834 PSYTX W PT 45 MINUTES: CPT

## 2022-08-10 NOTE — PROGRESS NOTES
[facility] JARED                Progress Note    [] I[place]  [x] Mariannae Gabe                    Patient Name: Magali Saucedo   : 2000     Case # :  [id number]  Therapist: Renata Lara [facility]        Objective/Service/Time:    IT 1.0  Topic: maintaining sober living     Career exploration    S- Client reports,  working and drug court. Client focused and cooperative & setting goals. O- Client full range, empathic  A- Client and this writer processed and setting goals.   P- Client attends after IT, and working, progressing in drug court                  Parth Julian [place], [place], Shannen 27 08/10/22, 1:58 PM

## 2022-08-11 ENCOUNTER — APPOINTMENT (OUTPATIENT)
Dept: PSYCHIATRY | Age: 22
End: 2022-08-11
Payer: MEDICAID

## 2022-08-17 ENCOUNTER — APPOINTMENT (OUTPATIENT)
Dept: PSYCHIATRY | Age: 22
End: 2022-08-17
Payer: MEDICAID

## 2022-08-18 ENCOUNTER — APPOINTMENT (OUTPATIENT)
Dept: PSYCHIATRY | Age: 22
End: 2022-08-18
Payer: MEDICAID

## 2022-08-24 ENCOUNTER — HOSPITAL ENCOUNTER (OUTPATIENT)
Dept: PSYCHIATRY | Age: 22
Setting detail: THERAPIES SERIES
Discharge: HOME | End: 2022-08-24
Payer: MEDICAID

## 2022-08-24 PROCEDURE — 80305 DRUG TEST PRSMV DIR OPT OBS: CPT

## 2022-08-24 PROCEDURE — 90834 PSYTX W PT 45 MINUTES: CPT

## 2022-08-24 NOTE — PROGRESS NOTES
[facility] JARED                Progress Note    [] [place]  [x] Maribell Javed                    Patient Name: Eunice Blanco   : 2000     Case # :  [id number]  Therapist: Alec Hollis        Objective/Service/Time:    IT 1.0  UDS . 35 Topic: exploring career options, coping 1/2  S- Client reports, concerned for his brothers, progressing in your Drug Court, and making Nelma Le. Client has [facility] review hearing. O-Oriented, focused, empowered, no cravings  A- Client and this writer discussing career options, and moving to [employer]. Contemplating welding school, or jobs   [facility] working, drug court, and exploring careers.                   Haley Call MA, [place], LSW, [facility] 22, 2:11 PM

## 2022-08-25 ENCOUNTER — APPOINTMENT (OUTPATIENT)
Dept: PSYCHIATRY | Age: 22
End: 2022-08-25
Payer: MEDICAID

## 2022-09-01 ENCOUNTER — APPOINTMENT (OUTPATIENT)
Dept: PSYCHIATRY | Age: 22
End: 2022-09-01
Payer: MEDICAID

## 2022-09-07 ENCOUNTER — HOSPITAL ENCOUNTER (OUTPATIENT)
Dept: PSYCHIATRY | Age: 22
Setting detail: THERAPIES SERIES
Discharge: HOME | End: 2022-09-07
Payer: MEDICAID

## 2022-09-07 PROCEDURE — 90834 PSYTX W PT 45 MINUTES: CPT

## 2022-09-07 NOTE — PROGRESS NOTES
[facility]                Progress Note    [] [place]  [x] Rikki Norris                    Patient Name: Rush Espinoza   : 2000     Case # :  [id number]  Therapist: Dru Carpenter [facility]        Objective/Service/Time:    IT 1 hour, topic: after care, coping with illness, advancing to completion of drug court and court preparation for [place] [employer] review hearing  and   S- Client reports had an dehydration from the [facility] outpatient in [facility]. Client promotion at work. Client concerned about his brother who received an SHELLEY. Client preparing for court, received copies of progress reports. O- Client is cooperative, focused and engaged during the session  A- Client shared about his dehydration at a car show. Client being monitored for his heart. Client has hearing next week, processed his concerns.   P- Client finishing drug court, after care back in 2 weeks                   Dru Carpenter MA, [facility], [place], MAC 22, 2:09 PM

## 2022-09-08 ENCOUNTER — APPOINTMENT (OUTPATIENT)
Dept: PSYCHIATRY | Age: 22
End: 2022-09-08
Payer: MEDICAID

## 2022-09-15 ENCOUNTER — APPOINTMENT (OUTPATIENT)
Dept: PSYCHIATRY | Age: 22
End: 2022-09-15
Payer: MEDICAID

## 2022-09-21 ENCOUNTER — HOSPITAL ENCOUNTER (OUTPATIENT)
Dept: PSYCHIATRY | Age: 22
Setting detail: THERAPIES SERIES
Discharge: HOME | End: 2022-09-21
Payer: MEDICAID

## 2022-09-21 PROCEDURE — 90834 PSYTX W PT 45 MINUTES: CPT
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
## 2022-09-21 NOTE — PROGRESS NOTES
612 [facility] Discharge Treatment Plan    Philippe Goldstein  2000  Case # [id number]    Location: [] [place] [x] Debra Guzman    A. Stresses that I need to monitor  1. financial   2. legal          B. Major triggers to using alcohol/drugs   1. social   2. people        Sober Plan   1. Drug Court  2. Working out   3. Online resources    C. Sobriety Support   1. Friend: José Miguel Haynes   2. Treatment staff: [address] [place] [address]   3. Family member: Father   4. AA/NA recovery program, sponsor, meetings day/times, daily ready: online resources    D. Non-using activities  1. Driving range   2. Watch sports   3. Car shows    E. Consequences of Drug/Alcohol use  1. PV       G. Short term goals to achieve  1. Car and DL   2. Apartment hunting    H. Follow up recommendations  1. Follow recommendations drug court       Philippe Goldstein / 2000 has participated in the discharge treatment plan development outlined above on 9/21/2022.      Jacqueline Hill [facility]  9/21/2022/1:59 PM

## 2022-09-21 NOTE — PROGRESS NOTES
[facility] JARED                Progress Note    [] [place]  [x] Jordyn sorto                    Patient Name: Tahira Cortez   : 2000     Case # :  [id number]  The[facility]        Objective/Service/Time:    IT 1 hour, Treatment Planning, discharge planning  S- Client reports,  court went [facility]. Client focused and cooperative and setting goals. O- Client cooperative, engaged, and focused, progressing in Drug Court  A- Client and this writer discussed discharge planning  P- Client follow drug court recommendations.                    Elvia Lucero MA, [place], LSW, [facility] 22, 2:32 PM

## 2022-09-22 ENCOUNTER — APPOINTMENT (OUTPATIENT)
Dept: PSYCHIATRY | Age: 22
End: 2022-09-22
Payer: MEDICAID

## 2022-09-28 ENCOUNTER — APPOINTMENT (OUTPATIENT)
Dept: PSYCHIATRY | Age: 22
End: 2022-09-28
Payer: MEDICAID

## 2022-09-29 ENCOUNTER — APPOINTMENT (OUTPATIENT)
Dept: PSYCHIATRY | Age: 22
End: 2022-09-29
Payer: MEDICAID

## 2022-10-04 NOTE — ED PROVIDER NOTES
Emergency Department Encounter  [facility]    Patient: Susanne Katz  MRN: [id number]  : 2000  Date of evaluation: 2022  ED Provider: Jewell Barillas DO, FACEP    Chief Complaint:    Suicidal (pt had to be waken up. Difficult. GORGE for [address] PD 0.21. Pt denies being suicidal at this time. State (just wants to sleep))    [place]:  Susanne Katz is a 24 y.o. male that presents to the emergency department in police custody with complaints of suicidal ideation. The patient admits to drinking alcohol and he blew a 0.21 GORGE for the [employer]. He was brought to the emergency department because he was claiming he was suicidal. Upon arrival to the emergency department the patient was in the waiting area since there was no one to watch him he was in the waiting area with a  into the hallway bed was available for someone to sit with him. He was brought back upon evaluation the patient states he is not suicidal and if he simply told the police that he was suicidal because he \"did not want to go to senior care\". Patient is denying suicidal ideation at this time. He admits to drinking alcohol. He states he is just \"ready to go home\". ROS - see HPI, below listed is current ROS at time of my eval:  At least 10 systems reviewed and otherwise acutely negative except as in the [address]   General:  No fevers, no chills, no weakness  Eyes:  No recent vison changes, no discharge  ENT:  No sore throat, no nasal congestion, no hearing changes  Cardiovascular:  No chest pain, no palpitations  Respiratory:  No shortness of breath, no cough, no wheezing  Gastrointestinal:  No pain, no nausea, no vomiting, no diarrhea  Musculoskeletal:  No muscle pain, no joint pain  Skin:  No rash, no pruritis, no easy bruising  Neurologic:  No speech problems, no headache, no extremity numbness, no extremity tingling, no extremity weakness  Psychiatric:  No anxiety, denies suicidal ideation  Genitourinary:  No dysuria, no hematuria  Endocrine:  No unexpected weight gain, no unexpected weight loss  Extremities:  no edema, no pain    History reviewed. No pertinent past medical history. Past Surgical History:   Procedure Laterality Date    EYE SURGERY      HERNIA REPAIR       History reviewed. No pertinent family history. Social History     Socioeconomic History    Marital status: Single     Spouse name: Not on file    Number of children: Not on file    Years of education: Not on file    Highest education level: Not on file   Occupational History    Not on file   Tobacco Use    Smoking status: Never Smoker    Smokeless tobacco: Never Used   Vaping Use    Vaping Use: Every day    Substances: Nicotine   Substance and Sexual Activity    Alcohol use: Yes    Drug use: Not Currently     Types: Marijuana [place])    Sexual activity: Not on file   Other Topics Concern    Not on file   Social History Narrative    Not on file     Social Determinants of Health     Financial Resource Strain:     Difficulty of Paying Living Expenses: Not on file   Food Insecurity:     Worried About Running Out of Food in the Last Year: Not on file    Izaiah of Food in the Last Year: Not on file   Transportation Needs:     Lack of Transportation (Medical): Not on file    Lack of Transportation (Non-Medical):  Not on file   Physical Activity:     Days of Exercise per Week: Not on file    Minutes of Exercise per Session: Not on file   Stress:     Feeling of Stress : Not on file   Social Connections:     Frequency of Communication with Friends and Family: Not on file    Frequency of Social Gatherings with Friends and Family: Not on file    Attends Mormonism Services: Not on file    Active Member of Clubs or Organizations: Not on file    Attends Club or Organization Meetings: Not on file    Marital Status: Not on file   Intimate Partner Violence:     Fear of Current or Ex-Partner: Not on file   [facility] Emotionally Abused: Not on file    Physically Abused: Not on file    Sexually Abused: Not on file   Housing Stability:     Unable to Pay for Housing in the Last Year: Not on file    Number of Places Lived in the Last Year: Not on file    Unstable Housing in the Last Year: Not on file     No current facility-administered medications for this encounter. Current Outpatient Medications   Medication Sig Dispense Refill    ibuprofen (ADVIL;MOTRIN) 600 MG tablet Take 1 tablet by mouth every 6 hours as needed for Pain 30 tablet 0     No Known Allergies    Nursing Notes Reviewed    Physical Exam:  ED Triage Vitals [01/01/22 0813]   Enc Vitals Group      /89      Pulse 80      Resp 16      Temp 97.5 °F (36.4 °C)      Temp Source Oral      SpO2 96 %      Weight 150 lb (68 kg)      Height 5' 8\" (1.727 m)      Head Circumference       Peak Flow       Pain Score       Pain Loc       Pain Edu? Excl. in [address]? GENERAL APPEARANCE: Awake and alert. Cooperative. No acute distress. Nontoxic in appearance. HEAD: Normocephalic. Atraumatic. EYES: EOM's grossly intact. Sclera anicteric. ENT: Tolerates saliva. No trismus. NECK: Supple. Trachea midline. CARDIO: RRR. Radial pulse 2+. LUNGS: Respirations unlabored. CTAB. ABDOMEN: Soft. Non-distended. Non-tender. EXTREMITIES: No acute deformities. SKIN: Warm and dry. NEUROLOGICAL: No gross facial drooping. Moves all 4 extremities spontaneously. PSYCHIATRIC: Normal mood. Denies suicidal ideation. He denies this to me multiple times and states \"I am ready to go home\". Labs:  No results found for this visit on 01/01/22. Radiographs (if obtained):  [] The following radiograph was interpreted by myself in the absence of a radiologist:  [x] Radiologist's Report reviewed at time of ED visit:  No orders to display       ED Course and MDM:  Patient presents to the emergency department with intoxication in police custody.  He was claiming that he was suicidal to the police and so they brought him to the emergency department. The police have not pink slipped this patient. They have left him with us for evaluation and treatment. He states that he has no one he can call for a ride but later gives us his father's phone number. His father is reportedly on his way to  the patient to take him home and he will be released from the emergency department. He is instructed return for any suicidal thoughts or ideation. He is discharged in stable condition with a diagnosis of alcohol intoxication. Final Impression:  1. Acute alcoholic intoxication without complication [facility])      DISPOSITION Discharge - Pending Orders Complete    Patient referred to:  Jonathan [place] [id number]  [phone]  Schedule an appointment as soon as possible for a visit in 1 week  For follow up    Discharge medications:  New Prescriptions    No medications on file     (Please note that portions of this note may have been completed with a voice recognition program. Efforts were made to edit the dictations but occasionally words are mis-transcribed.)    Noelle Recinos DO, [address] [facility]  Board [place] [address] [place]  01/01/22 1866 Biopsy Type: H and E

## 2023-05-02 ENCOUNTER — NURSE TRIAGE (OUTPATIENT)
Dept: OTHER | Facility: CLINIC | Age: 23
End: 2023-05-02

## 2023-05-02 NOTE — TELEPHONE ENCOUNTER
Location of patient: [address] call from [facility] [employer]. Subjective: Caller states \"Back in August I was at a car show and it was hot out. I was there for a couple hours. Before we left I felt dizzy. I went to sit and I got heat flashes and my heart was palpitating. When we got in the car it kept going on and my arms went numb, mouth, lips. We saw the EMS and they checked me out and I went to [facility]. They did tests and said nothing was wrong except low Magnesium. I saw a cardiologist a few times and got a few EKG's. They said to do a surgery but I want a second opinion. Since then it is one and off. Basically every day I get left rib and chest pain. My step-mom thinks it is anxiety. \"     Current Symptoms: intermittent left sided chest pain. Worse at bedtime and driving. Onset: 8/2022  intermittent    Associated Symptoms: cardiologist wanted stress test which pt never did. Pain Severity: 3/10; left side of chest to left rib cage. sharp, stabbing; intermittent. \"Since I woke up\" 0800 \"this is how it normally is\"    Temperature: denies     What has been tried: nothing    LMP: NA Pregnant: NA    Recommended disposition: Go to ED/[facility] Now (Or to Office with PCP Approval) Pt states he had multiple EKG's in multiple ER's and multiple labs done before. Informed concern if actively having CP, cardiologist already informed him he has a known concern, ER's could address concern better than a PCP office. Pt does not currently have a PCP to schedule with. Pt agreeable to go to ED. Care advice provided, patient verbalizes understanding; denies any other questions or concerns; instructed to call back for any new or worsening symptoms. Patient/caller agrees to proceed to nearest Emergency Department    Attention Provider: Thank you for allowing me to participate in the care of your patient.   The patient was connected to triage in response to information provided to

## 2023-05-08 ENCOUNTER — OFFICE VISIT (OUTPATIENT)
Dept: INTERNAL MEDICINE CLINIC | Age: 23
End: 2023-05-08
Payer: MEDICAID

## 2023-05-08 VITALS
BODY MASS INDEX: 25.46 KG/M2 | WEIGHT: 168 LBS | DIASTOLIC BLOOD PRESSURE: 78 MMHG | HEART RATE: 72 BPM | SYSTOLIC BLOOD PRESSURE: 120 MMHG | RESPIRATION RATE: 16 BRPM | OXYGEN SATURATION: 94 % | HEIGHT: 68 IN | TEMPERATURE: 97.2 F

## 2023-05-08 DIAGNOSIS — I49.9 CARDIAC ARRHYTHMIA, UNSPECIFIED CARDIAC ARRHYTHMIA TYPE: ICD-10-CM

## 2023-05-08 DIAGNOSIS — R00.0 TACHYCARDIA: ICD-10-CM

## 2023-05-08 PROCEDURE — 99203 OFFICE O/P NEW LOW 30 MIN: CPT | Performed by: INTERNAL MEDICINE

## 2023-05-08 PROCEDURE — G8419 CALC BMI OUT NRM PARAM NOF/U: HCPCS | Performed by: INTERNAL MEDICINE

## 2023-05-08 PROCEDURE — 1036F TOBACCO NON-USER: CPT | Performed by: INTERNAL MEDICINE

## 2023-05-08 PROCEDURE — G8427 DOCREV CUR MEDS BY ELIG CLIN: HCPCS | Performed by: INTERNAL MEDICINE

## 2023-05-08 ASSESSMENT — PATIENT HEALTH QUESTIONNAIRE - PHQ9
SUM OF ALL RESPONSES TO PHQ QUESTIONS 1-9: 1
2. FEELING DOWN, DEPRESSED OR HOPELESS: 1
1. LITTLE INTEREST OR PLEASURE IN DOING THINGS: 0
SUM OF ALL RESPONSES TO PHQ9 QUESTIONS 1 & 2: 1

## 2023-05-08 ASSESSMENT — ENCOUNTER SYMPTOMS
COUGH: 0
WHEEZING: 0
EYES NEGATIVE: 1
GASTROINTESTINAL NEGATIVE: 1
ALLERGIC/IMMUNOLOGIC NEGATIVE: 1
SHORTNESS OF BREATH: 1

## 2023-05-08 NOTE — PROGRESS NOTES
Philippe Goldstein  Patient's  is 2000  Seen in office on 2023      SUBJECTIVE:  Aline mercado 21 y. o.year old male presents today   Chief Complaint   Patient presents with    New Patient     To estab. Other     Discomfort under left arm and radiates to midline of chest,since 2022     New patient  In 2022 Pt had left chest pain and palpitation   He was very nervous and felt going to pass out  Was taken to [employer]. And was sent to ER at [facility] [place] cardiologist  Pt had tachycardia  Further investigations were done. Pt still get chest pain off and on left side   No fever or chills  No headache  No syncope or near syncope  Pt works in [employer] in 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 He was referred to  EP but pt did not see him. An echocardiogram was ordered, stress test etc. but patient did not get that done. Chest x ray was normal.     Taking medications regularly. No side effects noted. Review of Systems   Constitutional: Negative. HENT: Negative. Eyes: Negative. Respiratory:  Positive for shortness of breath. Negative for cough and wheezing. Cardiovascular:  Positive for chest pain and palpitations. Negative for leg swelling. Gastrointestinal: Negative. Endocrine: Negative. Genitourinary: Negative. Musculoskeletal: Negative. Skin: Negative. Allergic/Immunologic: Negative. Neurological: Negative. Hematological: Negative. Psychiatric/Behavioral: Negative.        OBJECTIVE: /78   Pulse 72   Temp 97.2 °F (36.2 °C) (Temporal)   Resp 16   Ht 5' 8\" (1.727 m)   Wt 168 lb (76.2 kg)   SpO2 94%   BMI 25.54 kg/m²     Wt Readings from Last 3 Encounters:   23 168 lb (76.2 kg)   22 150 lb (68 kg)   21 150 lb (68 kg)      GENERAL: - Alert, oriented, pleasant, in no apparent

## 2023-06-08 ENCOUNTER — INITIAL CONSULT (OUTPATIENT)
Dept: CARDIOLOGY CLINIC | Age: 23
End: 2023-06-08
Payer: MEDICAID

## 2023-06-08 VITALS
SYSTOLIC BLOOD PRESSURE: 116 MMHG | HEIGHT: 70 IN | DIASTOLIC BLOOD PRESSURE: 76 MMHG | HEART RATE: 79 BPM | RESPIRATION RATE: 16 BRPM | WEIGHT: 166 LBS | BODY MASS INDEX: 23.77 KG/M2 | OXYGEN SATURATION: 99 %

## 2023-06-08 DIAGNOSIS — I49.9 CARDIAC ARRHYTHMIA, UNSPECIFIED CARDIAC ARRHYTHMIA TYPE: Primary | ICD-10-CM

## 2023-06-08 DIAGNOSIS — R00.2 PALPITATIONS: ICD-10-CM

## 2023-06-08 DIAGNOSIS — R00.0 TACHYCARDIA: ICD-10-CM

## 2023-06-08 PROCEDURE — G8420 CALC BMI NORM PARAMETERS: HCPCS | Performed by: INTERNAL MEDICINE

## 2023-06-08 PROCEDURE — G8427 DOCREV CUR MEDS BY ELIG CLIN: HCPCS | Performed by: INTERNAL MEDICINE

## 2023-06-08 PROCEDURE — 99204 OFFICE O/P NEW MOD 45 MIN: CPT | Performed by: INTERNAL MEDICINE

## 2023-06-08 PROCEDURE — 93000 ELECTROCARDIOGRAM COMPLETE: CPT | Performed by: INTERNAL MEDICINE

## 2023-06-08 NOTE — PROGRESS NOTES
Known Problems Paternal Grandfather     No Known Problems Maternal Aunt     No Known Problems Maternal Uncle     No Known Problems Paternal Aunt     No Known Problems Paternal Uncle     No Known Problems Maternal Cousin     No Known Problems Paternal Cousin      Social History     Tobacco Use    Smoking status: Never    Smokeless tobacco: Never   Substance Use Topics    Alcohol use: Yes        Review of Systems:   Constitutional: No Fever or Weight Loss   Eyes: No Decreased Vision  ENT: No Headaches, Hearing Loss or Vertigo  Cardiovascular: as per note above   Respiratory: No cough or wheezing and as per note above. Gastrointestinal: No abdominal pain, appetite loss, blood in stools, constipation, diarrhea or heartburn  Genitourinary: No dysuria, trouble voiding, or hematuria  Musculoskeletal:  denies any new  joint aches , swelling  or pain   Integumentary: No rash or pruritis  Neurological: No TIA or stroke symptoms  Psychiatric: No anxiety or depression  Endocrine: No malaise, fatigue or temperature intolerance  Hematologic/Lymphatic: No bleeding problems, blood clots or swollen lymph nodes  Allergic/Immunologic: No nasal congestion or hives    Objective:      Physical Exam:  /76 (Site: Left Upper Arm, Position: Sitting, Cuff Size: Medium Adult)   Pulse 79   Resp 16   Ht 5' 10\" (1.778 m)   Wt 166 lb (75.3 kg)   SpO2 99%   BMI 23.82 kg/m²   Wt Readings from Last 3 Encounters:   06/08/23 166 lb (75.3 kg)   05/08/23 168 lb (76.2 kg)   01/01/22 150 lb (68 kg)     Body mass index is 23.82 kg/m². Vitals:    06/08/23 0854   BP: 116/76   Pulse: 79   Resp: 16   SpO2: 99%        General Appearance:  No distress, conversant  Constitutional:  Well developed, Well nourished, No acute distress, Non-toxic appearance.    HENT:  Normocephalic, Atraumatic, Bilateral external ears normal, Oropharynx moist, No oral exudates, Nose normal. Neck- Normal range of motion, No tenderness, Supple, No stridor,no apical-carotid

## 2023-06-08 NOTE — ASSESSMENT & PLAN NOTE
Previous records and cardiology notes were reviewed there was concern for possible preexcitation on his EKG although he has short SC interval I do not see clear delta waves nevertheless we will get a treadmill and an echo to rule out structural heart disease also get a 30-day monitor will refer to EPS after further work-up was done .

## 2023-06-20 ENCOUNTER — OFFICE VISIT (OUTPATIENT)
Dept: INTERNAL MEDICINE CLINIC | Age: 23
End: 2023-06-20
Payer: MEDICAID

## 2023-06-20 VITALS
RESPIRATION RATE: 16 BRPM | TEMPERATURE: 97.2 F | DIASTOLIC BLOOD PRESSURE: 68 MMHG | SYSTOLIC BLOOD PRESSURE: 116 MMHG | WEIGHT: 170.2 LBS | OXYGEN SATURATION: 98 % | HEART RATE: 72 BPM | BODY MASS INDEX: 24.42 KG/M2

## 2023-06-20 DIAGNOSIS — R00.2 PALPITATIONS: Primary | ICD-10-CM

## 2023-06-20 DIAGNOSIS — R06.02 SOB (SHORTNESS OF BREATH): ICD-10-CM

## 2023-06-20 DIAGNOSIS — I49.9 CARDIAC ARRHYTHMIA, UNSPECIFIED CARDIAC ARRHYTHMIA TYPE: ICD-10-CM

## 2023-06-20 PROCEDURE — 1036F TOBACCO NON-USER: CPT | Performed by: INTERNAL MEDICINE

## 2023-06-20 PROCEDURE — G8427 DOCREV CUR MEDS BY ELIG CLIN: HCPCS | Performed by: INTERNAL MEDICINE

## 2023-06-20 PROCEDURE — G8420 CALC BMI NORM PARAMETERS: HCPCS | Performed by: INTERNAL MEDICINE

## 2023-06-20 PROCEDURE — 99213 OFFICE O/P EST LOW 20 MIN: CPT | Performed by: INTERNAL MEDICINE

## 2023-06-20 NOTE — PROGRESS NOTES
arrhythmia type     SOB (shortness of breath)        ASSESSMENT/PLAN:    Advised patient to get the test completed and get the echocardiogram done which is scheduled next week  Advised patient to discuss further with cardiologist about his symptoms of palpitations. Patient could have anxiety also. Return to office in a month  Mediations reviewed with the patient. Continue current medications. Appropriate prescriptions are addressed. After visit summeryprovided. Follow up as directed sooner if needed. Questions answered and patient verbalizes understanding. Call for any problems, questions, or concerns. No Known Allergies  No current outpatient medications on file. No current facility-administered medications for this visit. Past Medical History:   Diagnosis Date    Hx of exercise stress test 06/13/2023    Study negative for angina or ECG evidence of ischemia. Exercise tolerance is excellent. The patient exercised according to the [facility] for 12:00 mins, achieving a work level of Max. METS: 13.40. Appropriate hemodynamic response to exercise is noted.  Kumari treadmill score is +12 considered Low risk for cardiac events     Past Surgical History:   Procedure Laterality Date    EYE SURGERY      HERNIA REPAIR       Social History     Tobacco Use    Smoking status: Never    Smokeless tobacco: Never    Tobacco comments:     Vapes x 5 years   Substance Use Topics    Alcohol use: Not Currently       LAB REVIEW:  CBC: No results found for: WBC, HGB, HCT, PLT  Lipids: No results found for: HDL, LDLCALC, LDLDIRECT, TRIGLYCFAST, CHOLFAST  Renal: No results found for: BUN, CREATININE, NA, K, ALT, AST, GLUCOSE, GLUF  PT/INR: No results found for: INR  A1C: No results found for: Dmitry Grey MD, 6/20/2023 , 11:37 AM

## 2023-06-27 ENCOUNTER — PROCEDURE VISIT (OUTPATIENT)
Dept: CARDIOLOGY CLINIC | Age: 23
End: 2023-06-27
Payer: MEDICAID

## 2023-06-27 DIAGNOSIS — I49.9 CARDIAC ARRHYTHMIA, UNSPECIFIED CARDIAC ARRHYTHMIA TYPE: Primary | ICD-10-CM

## 2023-06-27 LAB
LV EF: 58 %
LVEF MODALITY: NORMAL

## 2023-06-27 PROCEDURE — 93306 TTE W/DOPPLER COMPLETE: CPT | Performed by: INTERNAL MEDICINE

## 2023-06-29 ENCOUNTER — TELEPHONE (OUTPATIENT)
Dept: CARDIOLOGY CLINIC | Age: 23
End: 2023-06-29

## 2023-07-13 ENCOUNTER — OFFICE VISIT (OUTPATIENT)
Dept: CARDIOLOGY CLINIC | Age: 23
End: 2023-07-13
Payer: MEDICAID

## 2023-07-13 VITALS
DIASTOLIC BLOOD PRESSURE: 98 MMHG | HEIGHT: 70 IN | HEART RATE: 61 BPM | SYSTOLIC BLOOD PRESSURE: 134 MMHG | WEIGHT: 171 LBS | BODY MASS INDEX: 24.48 KG/M2

## 2023-07-13 DIAGNOSIS — R00.0 TACHYCARDIA: ICD-10-CM

## 2023-07-13 DIAGNOSIS — R07.9 CHEST PAIN, UNSPECIFIED TYPE: Primary | ICD-10-CM

## 2023-07-13 PROCEDURE — 93000 ELECTROCARDIOGRAM COMPLETE: CPT | Performed by: NURSE PRACTITIONER

## 2023-07-13 PROCEDURE — 99212 OFFICE O/P EST SF 10 MIN: CPT | Performed by: NURSE PRACTITIONER

## 2023-07-13 PROCEDURE — G8427 DOCREV CUR MEDS BY ELIG CLIN: HCPCS | Performed by: NURSE PRACTITIONER

## 2023-07-13 PROCEDURE — 4004F PT TOBACCO SCREEN RCVD TLK: CPT | Performed by: NURSE PRACTITIONER

## 2023-07-13 PROCEDURE — G8420 CALC BMI NORM PARAMETERS: HCPCS | Performed by: NURSE PRACTITIONER

## 2023-07-13 ASSESSMENT — ENCOUNTER SYMPTOMS
COUGH: 0
SHORTNESS OF BREATH: 0

## 2023-07-13 NOTE — PROGRESS NOTES
normal echocardiogram.    Stress test: 6/8/2023  Summary   Normal near maximal study negative for angina or ECG evidence of ischemia. Exercise tolerance is excellent. The patient exercised according to the   [facility] for 12:00 min:s, achieving a work level of Max. METS: 13.40. Appropriate hemodynamic response to exercise is noted. Kumari treadmill score is +12 considered Low risk for cardiac events. 30 day event monitor  Kaylynn Chin MD  6/8/2023  5:52 PM EDT  30-day monitor from [facility] showed episodes of sinus tachycardia no clear arrhythmias noted        ASSESSMENT/PLAN:  Palpitations  Continues to have palpations   Previous records and cardiology notes were reviewed there was concern for possible preexcitation on his EKG although he has short [place] interval I do not see clear delta waves nevertheless we will get a treadmill and an echo to rule out structural heart disease also get a 30-day monitor   Normal stress and echo  will refer to EPS      Dyslipidemia  All available lab work was reviewed. Patient was advised to repeat lab work before next visit. Necessary orders were placed , instructions given by myself         Counseled extensively and medication compliance urged. We discussed that for the  prevention of ASCVD our  goal is aggressive risk modification. Patient is encouraged to exercise if they can , educated about  brisk walk for 30 minutes  at least 3 to 4 times a week if there are no physical limitations  Various goals were discussed and questions answered. Continue current medications. Appropriate prescriptions are addressed and refills ordered. Questions answered and patient verbalizes understanding. Call for any problems, questions, or concerns. Greater than 60 % of time spent counseling besides reviewing data and images      No follow-ups on file. An electronic signature was used to authenticate this note.     Electronically signed by JOSEPHINE Meléndez CNP on 7/13/2023 at 9:31

## 2023-07-24 ENCOUNTER — INITIAL CONSULT (OUTPATIENT)
Dept: CARDIOLOGY CLINIC | Age: 23
End: 2023-07-24
Payer: MEDICAID

## 2023-07-24 VITALS
DIASTOLIC BLOOD PRESSURE: 60 MMHG | HEIGHT: 70 IN | BODY MASS INDEX: 24.2 KG/M2 | WEIGHT: 169 LBS | TEMPERATURE: 80 F | SYSTOLIC BLOOD PRESSURE: 110 MMHG

## 2023-07-24 DIAGNOSIS — I45.6 WPW (WOLFF-PARKINSON-WHITE SYNDROME): Primary | ICD-10-CM

## 2023-07-24 PROCEDURE — G8427 DOCREV CUR MEDS BY ELIG CLIN: HCPCS | Performed by: INTERNAL MEDICINE

## 2023-07-24 PROCEDURE — G8420 CALC BMI NORM PARAMETERS: HCPCS | Performed by: INTERNAL MEDICINE

## 2023-07-24 PROCEDURE — 99204 OFFICE O/P NEW MOD 45 MIN: CPT | Performed by: INTERNAL MEDICINE

## 2023-07-24 PROCEDURE — 4004F PT TOBACCO SCREEN RCVD TLK: CPT | Performed by: INTERNAL MEDICINE

## 2023-07-24 NOTE — PROGRESS NOTES
Electrophysiology Consult Note    Primary care physician: Amanda Barker MD    Referring Provider: Dr. 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 o.year old  male who has prior history of palpitations when last seen for [employer] pattern on his EKG. 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 By the time he got to the hospital his heart rate already improved. He was discharged home with advised to follow-up with cardiology. He subsequently saw my colleague Dr. Liat Harris who did an event monitor, stress test and echocardiogram.  His testing is essentially unremarkable and no significant arrhythmias were noted. His stress test did show that his preexcitation improved slightly or remained unchanged with stress. Today he is here for electrophysiology evaluation. Patient states that he has intermittent palpitations which usually last for a few minutes. He did not have any severe episodes like the one he had in August 2022. He has never passed out. There is no family history of sudden cardiac death. Past medical history:    has a past medical history of H/O echocardiogram and Hx of exercise stress test.    Past surgical history:   has a past surgical history that includes hernia repair and eye surgery. Social History:   reports that he has never smoked. He uses smokeless tobacco. He reports that he does not currently use alcohol. He reports that he does not currently use drugs after having used the following drugs: Marijuana Viola Marcia). Family history:  No family history of premature CAD  No Known Allergies    No current facility-administered medications for this visit. No current outpatient medications on file. No current facility-administered medications for this visit.      Review of Systems:   Constitutional: No Fever or Weight Loss   Eyes: No Decreased

## 2023-08-22 ENCOUNTER — OFFICE VISIT (OUTPATIENT)
Dept: INTERNAL MEDICINE CLINIC | Age: 23
End: 2023-08-22
Payer: MEDICAID

## 2023-08-22 VITALS
WEIGHT: 164 LBS | OXYGEN SATURATION: 98 % | SYSTOLIC BLOOD PRESSURE: 110 MMHG | DIASTOLIC BLOOD PRESSURE: 68 MMHG | TEMPERATURE: 97.5 F | HEIGHT: 68 IN | HEART RATE: 64 BPM | BODY MASS INDEX: 24.86 KG/M2 | RESPIRATION RATE: 12 BRPM

## 2023-08-22 DIAGNOSIS — R07.9 LEFT-SIDED CHEST PAIN: ICD-10-CM

## 2023-08-22 DIAGNOSIS — R00.2 PALPITATIONS: Primary | ICD-10-CM

## 2023-08-22 DIAGNOSIS — I45.6 WPW SYNDROME: ICD-10-CM

## 2023-08-22 DIAGNOSIS — F41.9 ANXIETY: ICD-10-CM

## 2023-08-22 PROCEDURE — G8420 CALC BMI NORM PARAMETERS: HCPCS | Performed by: INTERNAL MEDICINE

## 2023-08-22 PROCEDURE — 4004F PT TOBACCO SCREEN RCVD TLK: CPT | Performed by: INTERNAL MEDICINE

## 2023-08-22 PROCEDURE — G8427 DOCREV CUR MEDS BY ELIG CLIN: HCPCS | Performed by: INTERNAL MEDICINE

## 2023-08-22 PROCEDURE — 99213 OFFICE O/P EST LOW 20 MIN: CPT | Performed by: INTERNAL MEDICINE

## 2023-08-22 RX ORDER — HYDROXYZINE HYDROCHLORIDE 10 MG/1
10 TABLET, FILM COATED ORAL DAILY PRN
Qty: 30 TABLET | Refills: 0 | Status: SHIPPED | OUTPATIENT
Start: 2023-08-22

## 2023-08-22 ASSESSMENT — PATIENT HEALTH QUESTIONNAIRE - PHQ9
SUM OF ALL RESPONSES TO PHQ9 QUESTIONS 1 & 2: 0
SUM OF ALL RESPONSES TO PHQ QUESTIONS 1-9: 0
2. FEELING DOWN, DEPRESSED OR HOPELESS: 0
SUM OF ALL RESPONSES TO PHQ QUESTIONS 1-9: 0
SUM OF ALL RESPONSES TO PHQ QUESTIONS 1-9: 0
1. LITTLE INTEREST OR PLEASURE IN DOING THINGS: 0
SUM OF ALL RESPONSES TO PHQ QUESTIONS 1-9: 0

## 2023-08-22 NOTE — PROGRESS NOTES
CHOLFAST  Renal: No results found for: BUN, CREATININE, NA, K, ALT, AST, GLUCOSE, GLUF  PT/INR: No results found for: INR  A1C: No results found for: Lena Fischer MD, 8/22/2023 , 10:44 AM

## 2023-08-27 PROBLEM — R07.9 LEFT-SIDED CHEST PAIN: Status: ACTIVE | Noted: 2023-08-27

## 2023-08-27 PROBLEM — I45.6 WPW SYNDROME: Status: ACTIVE | Noted: 2023-08-27

## 2023-08-27 PROBLEM — F41.9 ANXIETY: Status: ACTIVE | Noted: 2023-08-27

## 2025-04-11 ENCOUNTER — OFFICE VISIT (OUTPATIENT)
Age: 25
End: 2025-04-11
Payer: COMMERCIAL

## 2025-04-11 VITALS
HEIGHT: 67 IN | BODY MASS INDEX: 28.79 KG/M2 | OXYGEN SATURATION: 98 % | TEMPERATURE: 98.1 F | SYSTOLIC BLOOD PRESSURE: 130 MMHG | WEIGHT: 183.4 LBS | DIASTOLIC BLOOD PRESSURE: 100 MMHG | HEART RATE: 72 BPM | RESPIRATION RATE: 12 BRPM

## 2025-04-11 DIAGNOSIS — K21.9 GASTROESOPHAGEAL REFLUX DISEASE WITHOUT ESOPHAGITIS: ICD-10-CM

## 2025-04-11 DIAGNOSIS — I10 BENIGN ESSENTIAL HTN: ICD-10-CM

## 2025-04-11 DIAGNOSIS — I45.6 WPW SYNDROME: ICD-10-CM

## 2025-04-11 DIAGNOSIS — R00.2 PALPITATIONS: Primary | ICD-10-CM

## 2025-04-11 PROCEDURE — 99213 OFFICE O/P EST LOW 20 MIN: CPT | Performed by: INTERNAL MEDICINE

## 2025-04-11 PROCEDURE — 3074F SYST BP LT 130 MM HG: CPT | Performed by: INTERNAL MEDICINE

## 2025-04-11 PROCEDURE — 3080F DIAST BP >= 90 MM HG: CPT | Performed by: INTERNAL MEDICINE

## 2025-04-11 RX ORDER — OMEPRAZOLE 20 MG/1
20 CAPSULE, DELAYED RELEASE ORAL DAILY
COMMUNITY
Start: 2024-11-26 | End: 2025-04-11 | Stop reason: ALTCHOICE

## 2025-04-11 RX ORDER — FAMOTIDINE 20 MG/1
20 TABLET, FILM COATED ORAL 2 TIMES DAILY
COMMUNITY
Start: 2024-11-26 | End: 2025-04-11 | Stop reason: ALTCHOICE

## 2025-04-11 RX ORDER — AMLODIPINE BESYLATE 5 MG/1
5 TABLET ORAL DAILY
Qty: 30 TABLET | Refills: 0 | Status: SHIPPED | OUTPATIENT
Start: 2025-04-11 | End: 2025-04-11 | Stop reason: SDUPTHER

## 2025-04-11 RX ORDER — PANTOPRAZOLE SODIUM 40 MG/1
40 TABLET, DELAYED RELEASE ORAL
Qty: 30 TABLET | Refills: 1 | Status: SHIPPED | OUTPATIENT
Start: 2025-04-11

## 2025-04-11 RX ORDER — AMLODIPINE BESYLATE 5 MG/1
5 TABLET ORAL DAILY
Qty: 30 TABLET | Refills: 1 | Status: SHIPPED | OUTPATIENT
Start: 2025-04-11

## 2025-04-11 RX ORDER — LISINOPRIL 10 MG/1
10 TABLET ORAL DAILY
COMMUNITY
Start: 2025-01-08 | End: 2025-04-11 | Stop reason: ALTCHOICE

## 2025-04-11 RX ORDER — PANTOPRAZOLE SODIUM 40 MG/1
40 TABLET, DELAYED RELEASE ORAL
Qty: 90 TABLET | Refills: 1 | Status: SHIPPED | OUTPATIENT
Start: 2025-04-11 | End: 2025-04-11 | Stop reason: SDUPTHER

## 2025-04-11 ASSESSMENT — PATIENT HEALTH QUESTIONNAIRE - PHQ9
SUM OF ALL RESPONSES TO PHQ QUESTIONS 1-9: 0
2. FEELING DOWN, DEPRESSED OR HOPELESS: NOT AT ALL
SUM OF ALL RESPONSES TO PHQ QUESTIONS 1-9: 0
1. LITTLE INTEREST OR PLEASURE IN DOING THINGS: NOT AT ALL
SUM OF ALL RESPONSES TO PHQ QUESTIONS 1-9: 0
SUM OF ALL RESPONSES TO PHQ QUESTIONS 1-9: 0

## 2025-04-11 NOTE — PROGRESS NOTES
Philippe Goldstein  Patient's  is 2000  Seen in office on 2025      SUBJECTIVE:  Philippe mercado 25 y.o.year old male presents today   Chief Complaint   Patient presents with    Hypertension    Chest Pain     Constant, x 2 years, after eating pain goes into back, and burping a lot , felt a knot mid sternum     History of Present Illness  The patient is a 25-year-old male who presents for evaluation of chest pain, palpitations, and elevated blood pressure.    Persistent central chest pain is reported, which is exacerbated when retracting the shoulders. A cracking sensation in the middle of the chest is also noted. Physical activity is limited to mild exercises with dumbbells, and heavy weightlifting at the gym is not performed. A lump in the lower part of the sternum is felt when lying on the back at night.     Palpitations have been a concern, leading to consultations with a cardiologist 2 years ago.  Despite normal results from stress tests, EKGs, and echocardiograms, ablation therapy was suggested but not pursued. Tachycardia is no longer experienced, but a sensation of hard heartbeats persists. A heart monitor was worn for 30 days a couple of years ago. Occasional dizziness and lightheadedness occur, particularly during physical activity at work.    Blood pressure has been monitored daily for several months, with consistently elevated readings, reaching up to 150/110. The patient is uncertain if these readings are due to stress or anxiety. A healthy diet is maintained, and high blood pressure has been present for several months. Lisinopril 5 mg was previously tried, but no antihypertensive medication is currently taken.    Heartburn and nausea have been experienced for approximately one month, for which omeprazole was taken for about a month.      Taking medications regularly. No side effects noted.    Review of Systems  Review of system normal except as in HPI  OBJECTIVE: BP (!) 130/100 (BP Site: Right Upper

## 2025-05-06 ENCOUNTER — HOSPITAL ENCOUNTER (OUTPATIENT)
Age: 25
Discharge: HOME | End: 2025-05-06
Payer: COMMERCIAL

## 2025-05-06 DIAGNOSIS — R00.2 PALPITATIONS: ICD-10-CM

## 2025-05-06 LAB
BASOPHILS # BLD: 0.03 K/UL
BASOPHILS NFR BLD: 1 % (ref 0–1)
EOSINOPHIL # BLD: 0.05 K/UL
EOSINOPHILS RELATIVE PERCENT: 1 % (ref 0–3)
ERYTHROCYTE [DISTWIDTH] IN BLOOD BY AUTOMATED COUNT: 11.4 % (ref 11.7–14.9)
HCT VFR BLD AUTO: 46.8 % (ref 42–52)
HGB BLD-MCNC: 16.5 G/DL (ref 13.5–18)
IMM GRANULOCYTES # BLD AUTO: 0.01 K/UL
IMM GRANULOCYTES NFR BLD: 0 %
LYMPHOCYTES NFR BLD: 1.39 K/UL
LYMPHOCYTES RELATIVE PERCENT: 24 % (ref 24–44)
MCH RBC QN AUTO: 31.6 PG (ref 27–31)
MCHC RBC AUTO-ENTMCNC: 35.3 G/DL (ref 32–36)
MCV RBC AUTO: 89.7 FL (ref 78–100)
MONOCYTES NFR BLD: 0.5 K/UL
MONOCYTES NFR BLD: 9 % (ref 0–5)
NEUTROPHILS NFR BLD: 66 % (ref 36–66)
NEUTS SEG NFR BLD: 3.91 K/UL
PLATELET # BLD AUTO: 163 K/UL (ref 140–440)
PMV BLD AUTO: 11.1 FL (ref 7.5–11.1)
RBC # BLD AUTO: 5.22 M/UL (ref 4.6–6.2)
T4 FREE SERPL-MCNC: 1.1 NG/DL (ref 0.9–1.8)
TSH SERPL DL<=0.05 MIU/L-ACNC: 1.53 UIU/ML (ref 0.27–4.2)
WBC OTHER # BLD: 5.9 K/UL (ref 4–10.5)

## 2025-05-06 PROCEDURE — 84443 ASSAY THYROID STIM HORMONE: CPT

## 2025-05-06 PROCEDURE — 85025 COMPLETE CBC W/AUTO DIFF WBC: CPT

## 2025-05-06 PROCEDURE — 36415 COLL VENOUS BLD VENIPUNCTURE: CPT

## 2025-05-06 PROCEDURE — 84439 ASSAY OF FREE THYROXINE: CPT

## 2025-05-08 ENCOUNTER — RESULTS FOLLOW-UP (OUTPATIENT)
Age: 25
End: 2025-05-08

## 2025-05-12 ENCOUNTER — HOSPITAL ENCOUNTER (OUTPATIENT)
Dept: GENERAL RADIOLOGY | Age: 25
Discharge: HOME | End: 2025-05-12
Payer: COMMERCIAL

## 2025-05-12 DIAGNOSIS — K21.9 GASTROESOPHAGEAL REFLUX DISEASE WITHOUT ESOPHAGITIS: ICD-10-CM

## 2025-05-12 PROCEDURE — 74240 X-RAY XM UPR GI TRC 1CNTRST: CPT

## 2025-05-13 ENCOUNTER — OFFICE VISIT (OUTPATIENT)
Age: 25
End: 2025-05-13
Payer: COMMERCIAL

## 2025-05-13 VITALS
HEART RATE: 68 BPM | DIASTOLIC BLOOD PRESSURE: 86 MMHG | RESPIRATION RATE: 16 BRPM | OXYGEN SATURATION: 98 % | TEMPERATURE: 98.5 F | BODY MASS INDEX: 28.57 KG/M2 | WEIGHT: 182.4 LBS | SYSTOLIC BLOOD PRESSURE: 130 MMHG

## 2025-05-13 DIAGNOSIS — D22.9 ATYPICAL MOLE: ICD-10-CM

## 2025-05-13 DIAGNOSIS — R07.9 RECURRENT CHEST PAIN: Primary | ICD-10-CM

## 2025-05-13 DIAGNOSIS — K21.9 GASTROESOPHAGEAL REFLUX DISEASE WITHOUT ESOPHAGITIS: ICD-10-CM

## 2025-05-13 DIAGNOSIS — I45.6 WPW SYNDROME: ICD-10-CM

## 2025-05-13 DIAGNOSIS — I10 BENIGN ESSENTIAL HTN: ICD-10-CM

## 2025-05-13 DIAGNOSIS — I10 PRIMARY HYPERTENSION: ICD-10-CM

## 2025-05-13 DIAGNOSIS — R00.2 PALPITATIONS: ICD-10-CM

## 2025-05-13 DIAGNOSIS — R07.9 LEFT-SIDED CHEST PAIN: ICD-10-CM

## 2025-05-13 PROCEDURE — 99214 OFFICE O/P EST MOD 30 MIN: CPT | Performed by: INTERNAL MEDICINE

## 2025-05-13 PROCEDURE — 3075F SYST BP GE 130 - 139MM HG: CPT | Performed by: INTERNAL MEDICINE

## 2025-05-13 PROCEDURE — 3079F DIAST BP 80-89 MM HG: CPT | Performed by: INTERNAL MEDICINE

## 2025-05-13 RX ORDER — PANTOPRAZOLE SODIUM 40 MG/1
40 TABLET, DELAYED RELEASE ORAL
Qty: 30 TABLET | Refills: 1 | Status: SHIPPED | OUTPATIENT
Start: 2025-05-13

## 2025-05-13 RX ORDER — AMLODIPINE BESYLATE 5 MG/1
5 TABLET ORAL DAILY
Qty: 30 TABLET | Refills: 5 | Status: SHIPPED | OUTPATIENT
Start: 2025-05-13

## 2025-05-13 SDOH — ECONOMIC STABILITY: INCOME INSECURITY: IN THE LAST 12 MONTHS, WAS THERE A TIME WHEN YOU WERE NOT ABLE TO PAY THE MORTGAGE OR RENT ON TIME?: PATIENT DECLINED

## 2025-05-13 SDOH — ECONOMIC STABILITY: FOOD INSECURITY: WITHIN THE PAST 12 MONTHS, THE FOOD YOU BOUGHT JUST DIDN'T LAST AND YOU DIDN'T HAVE MONEY TO GET MORE.: PATIENT DECLINED

## 2025-05-13 SDOH — ECONOMIC STABILITY: TRANSPORTATION INSECURITY
IN THE PAST 12 MONTHS, HAS THE LACK OF TRANSPORTATION KEPT YOU FROM MEDICAL APPOINTMENTS OR FROM GETTING MEDICATIONS?: PATIENT DECLINED

## 2025-05-13 SDOH — ECONOMIC STABILITY: TRANSPORTATION INSECURITY
IN THE PAST 12 MONTHS, HAS LACK OF TRANSPORTATION KEPT YOU FROM MEETINGS, WORK, OR FROM GETTING THINGS NEEDED FOR DAILY LIVING?: PATIENT DECLINED

## 2025-05-13 NOTE — PROGRESS NOTES
Philippe Goldstein  Patient's  is 2000  Seen in office on 2025      SUBJECTIVE:  Philippe mercado 25 y.o.year old male presents today   Chief Complaint   Patient presents with    Results     Lab review and UGI    Chest Pain     Did a run yesterday and chest was hurting afterward    Medication Refill     History of Present Illness  The patient presents for evaluation of hypertension, chest pain, moles, anxiety, and elevated monocytes.    He underwent a UGI test and blood work on 2025. He has not yet received a call from the cardiologist. He has been using an EKG device at his workplace, which consistently shows a normal rhythm. He experiences palpitations, particularly when sitting or lying down at night, but these are not as rapid as previous episodes. He recalls undergoing various tests with Dr. Altman, all of which returned normal results. He was informed that he might need to undergo an EP study and ablation procedure.    He reports experiencing chest pain, which intensifies when he retracts his shoulders. He also reports back pain when he retracts his shoulders. He does not experience heartburn but notes a burning sensation upon burping. He has not observed any significant changes since starting pantoprazole.    He has noticed new spots on his arm, which were not present in his youth. He has not sought medical attention for these spots. He is unaware of any family history of abnormal moles.    He reports no issues with amlodipine 5 mg daily. He has 1 tablet left of each medication. He does not believe there is a family history of high blood pressure at a young age.    He reports no erectile dysfunction but expresses interest in having his testosterone and homocysteine levels checked.    He reports a long-standing aversion to public speaking and social anxiety, which manifests as facial flushing and hand tingling. His stepmother suspects this may be due to anxiety.    FAMILY HISTORY  He reports a family

## 2025-05-15 ENCOUNTER — OFFICE VISIT (OUTPATIENT)
Dept: CARDIOLOGY CLINIC | Age: 25
End: 2025-05-15
Payer: COMMERCIAL

## 2025-05-15 ENCOUNTER — TELEPHONE (OUTPATIENT)
Dept: CARDIOLOGY CLINIC | Age: 25
End: 2025-05-15

## 2025-05-15 VITALS
SYSTOLIC BLOOD PRESSURE: 130 MMHG | WEIGHT: 180.6 LBS | HEIGHT: 70 IN | DIASTOLIC BLOOD PRESSURE: 74 MMHG | HEART RATE: 89 BPM | BODY MASS INDEX: 25.86 KG/M2

## 2025-05-15 DIAGNOSIS — R07.9 LEFT-SIDED CHEST PAIN: ICD-10-CM

## 2025-05-15 DIAGNOSIS — K21.9 GASTROESOPHAGEAL REFLUX DISEASE WITHOUT ESOPHAGITIS: ICD-10-CM

## 2025-05-15 DIAGNOSIS — I10 PRIMARY HYPERTENSION: ICD-10-CM

## 2025-05-15 DIAGNOSIS — I49.9 CARDIAC ARRHYTHMIA, UNSPECIFIED CARDIAC ARRHYTHMIA TYPE: ICD-10-CM

## 2025-05-15 DIAGNOSIS — I10 BENIGN ESSENTIAL HTN: ICD-10-CM

## 2025-05-15 DIAGNOSIS — I45.6 WPW SYNDROME: Primary | ICD-10-CM

## 2025-05-15 PROCEDURE — 3078F DIAST BP <80 MM HG: CPT | Performed by: INTERNAL MEDICINE

## 2025-05-15 PROCEDURE — 99214 OFFICE O/P EST MOD 30 MIN: CPT | Performed by: INTERNAL MEDICINE

## 2025-05-15 PROCEDURE — 3075F SYST BP GE 130 - 139MM HG: CPT | Performed by: INTERNAL MEDICINE

## 2025-05-15 RX ORDER — PANTOPRAZOLE SODIUM 40 MG/1
TABLET, DELAYED RELEASE ORAL
Qty: 30 TABLET | Refills: 1 | OUTPATIENT
Start: 2025-05-15

## 2025-05-15 RX ORDER — AMLODIPINE BESYLATE 5 MG/1
5 TABLET ORAL DAILY
Qty: 30 TABLET | Refills: 1 | OUTPATIENT
Start: 2025-05-15

## 2025-05-15 NOTE — PROGRESS NOTES
CARDIOLOGY  NOTE    Chief Complaint: Palpitations     HPI:   Philippe is a 25 y.o. year old who has Past medical history as noted below.   He is having frequent intermittent episodes of palpitations yesterday he went to the emergency department after an episode of palpitations his EKG does show possible preexcitation pattern but he was not on normal rhythm he frequently sees his heart rate is in the 100s.    Comes in for evaluation due to an episode of palpitation which occurred in August 2022 while he was at a car show he states that he initially felt warm and flushed and then started having palpitations after which his arms stiffened up and he has a tingling sensation all over his body he went to the emergency department as part of work-up he had an EKG there was concern for possible preexcitation or short WA interval seen on his EKG he was evaluated by cardiology at [facility] and advised to undergo a stress test and refer to EPS for further evaluation nevertheless he never followed up.  .  There is no family history of sudden cardiac death or early cardiac disease.  Growing up he did not have any symptoms      Current Outpatient Medications   Medication Sig Dispense Refill    amLODIPine (NORVASC) 5 MG tablet Take 1 tablet by mouth daily 30 tablet 5    pantoprazole (PROTONIX) 40 MG tablet Take 1 tablet by mouth every morning (before breakfast) 30 tablet 1     No current facility-administered medications for this visit.       Allergies:   Patient has no known allergies.    Patient History:  Past Medical History:   Diagnosis Date    H/O echocardiogram 06/27/2023    EF 55-60% normal study    Hx of exercise stress test 06/13/2023    Study negative for angina or ECG evidence of ischemia. Exercise tolerance is excellent. The patient exercised according to the RICHELLE for 12:00 min’s, achieving a work level of Max. METS: 13.40. Appropriate hemodynamic response to

## 2025-05-15 NOTE — TELEPHONE ENCOUNTER
Dr. Yu would like for patient to see Dr. Anglin as soon as possible for WPW and possible ablation, Aimee would like him to be seen next week. Please call patient and schedule when able. Thank you.

## 2025-05-16 ENCOUNTER — TELEPHONE (OUTPATIENT)
Age: 25
End: 2025-05-16

## 2025-05-16 NOTE — TELEPHONE ENCOUNTER
Spoke with patient and scheduled consult with Dr. Anglin for 5/21/25 @ 1245pm.    Patient advised understanding.

## 2025-05-16 NOTE — TELEPHONE ENCOUNTER
Received referral from Dr. Yu to schedule consult with Dr. Anglin. Called patient to schedule, no answer, LM requesting Patient to call back.     Will try again to contact patient.

## 2025-05-19 ENCOUNTER — RESULTS FOLLOW-UP (OUTPATIENT)
Age: 25
End: 2025-05-19

## 2025-05-19 ENCOUNTER — HOSPITAL ENCOUNTER (OUTPATIENT)
Dept: CT IMAGING | Age: 25
Discharge: HOME | End: 2025-05-19
Payer: COMMERCIAL

## 2025-05-19 DIAGNOSIS — R07.9 RECURRENT CHEST PAIN: ICD-10-CM

## 2025-05-19 PROCEDURE — 71250 CT THORAX DX C-: CPT

## 2025-05-21 ENCOUNTER — INITIAL CONSULT (OUTPATIENT)
Age: 25
End: 2025-05-21
Payer: COMMERCIAL

## 2025-05-21 VITALS
BODY MASS INDEX: 25.88 KG/M2 | DIASTOLIC BLOOD PRESSURE: 70 MMHG | HEART RATE: 58 BPM | WEIGHT: 180.8 LBS | HEIGHT: 70 IN | SYSTOLIC BLOOD PRESSURE: 120 MMHG

## 2025-05-21 DIAGNOSIS — Z71.9 ENCOUNTER FOR CONSULTATION: ICD-10-CM

## 2025-05-21 DIAGNOSIS — R00.2 PALPITATIONS: Primary | ICD-10-CM

## 2025-05-21 PROCEDURE — 3074F SYST BP LT 130 MM HG: CPT | Performed by: INTERNAL MEDICINE

## 2025-05-21 PROCEDURE — 3078F DIAST BP <80 MM HG: CPT | Performed by: INTERNAL MEDICINE

## 2025-05-21 PROCEDURE — 99204 OFFICE O/P NEW MOD 45 MIN: CPT | Performed by: INTERNAL MEDICINE

## 2025-05-21 PROCEDURE — 93000 ELECTROCARDIOGRAM COMPLETE: CPT | Performed by: INTERNAL MEDICINE

## 2025-05-21 NOTE — PATIENT INSTRUCTIONS
Thank you for allowing us to care for you today!   We want to ensure we can follow your treatment plan and we strive to give you the best outcomes and experience possible.   If you ever have a life threatening emergency and call 911 - for an ambulance (EMS)  REMEMBER  Our providers can only care for you at:   [facility] or [facility]   Even if you have someone take you or you drive yourself we can only care for you in a [facility] facility. Our providers are not setup at the other healthcare locations!    PLEASE CALL OUR OFFICE DURING NORMAL BUSINESS HOURS  Monday through Friday 8 am to 5 pm  AFTER HOURS the physician on-call cannot help with scheduling, rescheduling, procedure instruction questions or any type of medication need or issue.  [facility] P:[phone] - [facility] P:[phone] - [facility] P:[phone]      If you receive a survey:  We would appreciate you taking the time to share your experience concerning your provider visit in the office.    These surveys are confidential!  We are eager to improve and are counting on you to share your feedback so we can ensure you get the best care possible.

## 2025-05-21 NOTE — PROGRESS NOTES
Electrophysiology Consult Note      Reason for consultation:  ? WPW    Chief complaint : Palpitations    Referring physician:       Primary care physician: Med Peterson MD      History of Present Illness:       History of Present Illness    The patient presents for evaluation of palpitations and possible WPW    He reports an episode of palpitations that occurred while he was at rest, specifically during a baseball game. He describes a sensation originating from his neck, followed by a rapid heart rate peaking at approximately 200 beats per minute. This episode lasted between 20 to 30 minutes, prompting him to seek medical attention at [facility]. Upon arrival at the hospital, his symptoms had subsided. An EKG was performed, and the results were reviewed by a physician who suggested the possibility of Jessica-Parkinson-White syndrome and recommended an ablation procedure.  Patient reports infrequent palpitations and during the palpitations when the heart rate is high he can have chest pain at times.  Patient denies any shortness of breath.  Patient denies any dizziness or syncope.  Patient does not smoke or drink.     No sudden cardiac death      Pastmedical history:   Past Medical History:   Diagnosis Date    H/O echocardiogram 06/27/2023    EF 55-60% normal study    Hx of exercise stress test 06/13/2023    Study negative for angina or ECG evidence of ischemia. Exercise tolerance is excellent. The patient exercised according to the RICHELLE for 12:00 min’s, achieving a work level of Max. METS: 13.40. Appropriate hemodynamic response to exercise is noted. Kumari treadmill score is +12 considered Low risk for cardiac events    Primary hypertension 5/13/2025    On amlodipine 5 mg daily       Surgical history :   Past Surgical History:   Procedure Laterality Date    EYE SURGERY      HERNIA REPAIR         Family history:   Family History   Problem Relation Age 
yes

## 2025-05-27 ENCOUNTER — HOSPITAL ENCOUNTER (EMERGENCY)
Age: 25
Discharge: HOME | End: 2025-05-27
Attending: EMERGENCY MEDICINE
Payer: COMMERCIAL

## 2025-05-27 VITALS
SYSTOLIC BLOOD PRESSURE: 153 MMHG | TEMPERATURE: 98 F | BODY MASS INDEX: 25.77 KG/M2 | HEART RATE: 74 BPM | OXYGEN SATURATION: 98 % | RESPIRATION RATE: 14 BRPM | DIASTOLIC BLOOD PRESSURE: 87 MMHG | HEIGHT: 70 IN | WEIGHT: 180 LBS

## 2025-05-27 DIAGNOSIS — I45.6 WPW (WOLFF-PARKINSON-WHITE SYNDROME): Primary | ICD-10-CM

## 2025-05-27 PROCEDURE — 93005 ELECTROCARDIOGRAM TRACING: CPT | Performed by: EMERGENCY MEDICINE

## 2025-05-27 PROCEDURE — 99283 EMERGENCY DEPT VISIT LOW MDM: CPT

## 2025-05-27 ASSESSMENT — PAIN SCALES - GENERAL: PAINLEVEL_OUTOF10: 2

## 2025-05-27 ASSESSMENT — ENCOUNTER SYMPTOMS: RESPIRATORY NEGATIVE: 1

## 2025-05-27 ASSESSMENT — PAIN DESCRIPTION - LOCATION: LOCATION: CHEST

## 2025-05-27 ASSESSMENT — PAIN - FUNCTIONAL ASSESSMENT: PAIN_FUNCTIONAL_ASSESSMENT: 0-10

## 2025-05-27 NOTE — ED PROVIDER NOTES
place, and time.      Cranial Nerves: No cranial nerve deficit.      Sensory: No sensory deficit.      Deep Tendon Reflexes: Reflexes are normal and symmetric. Reflexes normal.   Psychiatric:         Speech: Speech normal.         Behavior: Behavior normal.         Thought Content: Thought content normal.         Judgment: Judgment normal.         I have reviewed and interpreted all of the currently available lab results from this visit (ifapplicable):  Results for orders placed or performed during the hospital encounter of 05/27/25   EKG 12 Lead   Result Value Ref Range    Ventricular Rate 59 BPM    Atrial Rate 59 BPM    P-R Interval 116 ms    QRS Duration 118 ms    Q-T Interval 416 ms    QTc Calculation (Bazett) 411 ms    P Axis 49 degrees    R Axis 47 degrees    T Axis 39 degrees    Diagnosis       Sinus bradycardia  Jessica-Parkinson-White  Abnormal ECG  No previous ECGs available        Radiographs (if obtained):  [] The following radiograph wasinterpreted by myself in the absence of a radiologist:   [] Radiologist's Report Reviewed:  No orders to display         EKG (if obtained): (All EKG's are interpreted by myself in the absence of a cardiologist)      The 12 lead EKG was interpreted by me, and the interpretation is as follows:  normal sinus rhythm, rate = 59.  Intervals are within the normal range.  QTc is not prolonged.  ST elevations are not present.  T wave inversions are not present.  Non-specific T wave changes are not present.  Delta waves, Brugada Syndrome, and Short MO are present.  There is no acute ischemia.  [facility]      Chart review shows recent radiographs:  CT CHEST WO CONTRAST  Result Date: 5/19/2025  CT CHEST WO CONTRAST Reason for study: Chest pain, unspecified, Comparison: None TECHNIQUE: [facility] and reformatted in sagittal and coronal planes CT radiation dose optimization techniques (automated exposure control, and use of iterative

## 2025-05-28 LAB
EKG ATRIAL RATE: 59 BPM
EKG DIAGNOSIS: NORMAL
EKG P AXIS: 49 DEGREES
EKG P-R INTERVAL: 116 MS
EKG Q-T INTERVAL: 416 MS
EKG QRS DURATION: 118 MS
EKG QTC CALCULATION (BAZETT): 411 MS
EKG R AXIS: 47 DEGREES
EKG T AXIS: 39 DEGREES
EKG VENTRICULAR RATE: 59 BPM

## 2025-05-28 PROCEDURE — 93010 ELECTROCARDIOGRAM REPORT: CPT | Performed by: INTERNAL MEDICINE

## 2025-05-29 ENCOUNTER — OFFICE VISIT (OUTPATIENT)
Age: 25
End: 2025-05-29

## 2025-05-29 VITALS
OXYGEN SATURATION: 99 % | TEMPERATURE: 97.8 F | DIASTOLIC BLOOD PRESSURE: 88 MMHG | RESPIRATION RATE: 12 BRPM | SYSTOLIC BLOOD PRESSURE: 130 MMHG | WEIGHT: 179.2 LBS | BODY MASS INDEX: 25.71 KG/M2 | HEART RATE: 66 BPM

## 2025-05-29 DIAGNOSIS — I45.6 WPW SYNDROME: Primary | ICD-10-CM

## 2025-05-29 DIAGNOSIS — F41.9 ANXIETY: ICD-10-CM

## 2025-05-29 DIAGNOSIS — I10 PRIMARY HYPERTENSION: ICD-10-CM

## 2025-05-29 DIAGNOSIS — R00.2 PALPITATIONS: ICD-10-CM

## 2025-05-29 PROCEDURE — 3075F SYST BP GE 130 - 139MM HG: CPT | Performed by: INTERNAL MEDICINE

## 2025-05-29 PROCEDURE — 99213 OFFICE O/P EST LOW 20 MIN: CPT | Performed by: INTERNAL MEDICINE

## 2025-05-29 PROCEDURE — 3079F DIAST BP 80-89 MM HG: CPT | Performed by: INTERNAL MEDICINE

## 2025-05-29 RX ORDER — BUSPIRONE HYDROCHLORIDE 5 MG/1
5 TABLET ORAL 2 TIMES DAILY
Qty: 60 TABLET | Refills: 0 | Status: SHIPPED | OUTPATIENT
Start: 2025-05-29 | End: 2025-06-28

## 2025-06-03 DIAGNOSIS — R00.2 PALPITATIONS: Primary | ICD-10-CM

## 2025-06-12 ENCOUNTER — OFFICE VISIT (OUTPATIENT)
Dept: FAMILY MEDICINE CLINIC | Age: 25
End: 2025-06-12

## 2025-06-12 VITALS
DIASTOLIC BLOOD PRESSURE: 72 MMHG | SYSTOLIC BLOOD PRESSURE: 128 MMHG | HEART RATE: 73 BPM | OXYGEN SATURATION: 98 % | HEIGHT: 70 IN | TEMPERATURE: 98.4 F | WEIGHT: 176.6 LBS | BODY MASS INDEX: 25.28 KG/M2

## 2025-06-12 DIAGNOSIS — Z00.00 ENCOUNTER FOR ROUTINE HISTORY AND PHYSICAL EXAMINATION: Primary | ICD-10-CM

## 2025-06-12 PROCEDURE — 3074F SYST BP LT 130 MM HG: CPT | Performed by: NURSE PRACTITIONER

## 2025-06-12 PROCEDURE — 99395 PREV VISIT EST AGE 18-39: CPT | Performed by: NURSE PRACTITIONER

## 2025-06-12 PROCEDURE — 3078F DIAST BP <80 MM HG: CPT | Performed by: NURSE PRACTITIONER

## 2025-06-12 SDOH — ECONOMIC STABILITY: FOOD INSECURITY: WITHIN THE PAST 12 MONTHS, YOU WORRIED THAT YOUR FOOD WOULD RUN OUT BEFORE YOU GOT MONEY TO BUY MORE.: NEVER TRUE

## 2025-06-12 SDOH — ECONOMIC STABILITY: FOOD INSECURITY: WITHIN THE PAST 12 MONTHS, THE FOOD YOU BOUGHT JUST DIDN'T LAST AND YOU DIDN'T HAVE MONEY TO GET MORE.: NEVER TRUE

## 2025-06-12 ASSESSMENT — ENCOUNTER SYMPTOMS
EYES NEGATIVE: 1
CHEST TIGHTNESS: 0
GASTROINTESTINAL NEGATIVE: 1
WHEEZING: 0
ALLERGIC/IMMUNOLOGIC NEGATIVE: 1
COUGH: 0
SHORTNESS OF BREATH: 0

## 2025-06-12 NOTE — PROGRESS NOTES
Philippe Goldstein  2000  25 y.o.    SUBJECT ANISHA:    Chief Complaint   Patient presents with    H&P     [email or web address] [facility] physical        History of Present Illness  Philippe is a 25 year old male who is in for a routine history and physical examination required for the [place]. He states [facility]. He was diagnosed with Jessica-Parkinson-White syndrome. He is followed by cardiology for hypertension and is on medication which is managing his blood pressure. Blood pressure today - 128/72. He denies chest pain, palpitations or shortness of breath.    Past medical history  He gives a past medical history of primary hypertension, history of normal ECHO and history of negative exercise stress test.    Family medical history  He gives a family medical history of heart disease (paternal uncle) and substance abuse (mother).       Current Outpatient Medications on File Prior to Visit   Medication Sig Dispense Refill    busPIRone (BUSPAR) 5 MG tablet Take 1 tablet by mouth 2 times daily 60 tablet 0    amLODIPine (NORVASC) 5 MG tablet Take 1 tablet by mouth daily 30 tablet 5    pantoprazole (PROTONIX) 40 MG tablet Take 1 tablet by mouth every morning (before breakfast) (Patient not taking: Reported on 6/12/2025) 30 tablet 1     No current facility-administered medications on file prior to visit.       Past Medical History:   Diagnosis Date    H/O echocardiogram 06/27/2023    EF 55-60% normal study    Hx of exercise stress test 06/13/2023    Study negative for angina or ECG evidence of ischemia. Exercise tolerance is excellent. The patient exercised according to the RICHELLE for 12:00 min’s, achieving a work level of Max. METS: 13.40. Appropriate hemodynamic response to exercise is noted. Kumari treadmill score is +12 considered Low risk for cardiac events    Primary hypertension 5/13/2025    On amlodipine 5 mg daily     Past Surgical History:   Procedure Laterality Date    EYE SURGERY

## 2025-06-16 DIAGNOSIS — I10 BENIGN ESSENTIAL HTN: ICD-10-CM

## 2025-06-16 RX ORDER — AMLODIPINE BESYLATE 5 MG/1
5 TABLET ORAL DAILY
Qty: 30 TABLET | Refills: 4 | OUTPATIENT
Start: 2025-06-16

## 2025-06-24 ENCOUNTER — HOSPITAL ENCOUNTER (OUTPATIENT)
Age: 25
Discharge: HOME | End: 2025-06-24

## 2025-06-24 ENCOUNTER — CLINICAL SUPPORT (OUTPATIENT)
Age: 25
End: 2025-06-24

## 2025-06-24 ENCOUNTER — HOSPITAL ENCOUNTER (OUTPATIENT)
Dept: GENERAL RADIOLOGY | Age: 25
Discharge: HOME | End: 2025-06-24

## 2025-06-24 DIAGNOSIS — R00.2 PALPITATIONS: Primary | ICD-10-CM

## 2025-06-24 DIAGNOSIS — R00.2 PALPITATIONS: ICD-10-CM

## 2025-06-24 LAB
ANION GAP SERPL CALCULATED.3IONS-SCNC: 11 MMOL/L (ref 9–17)
BUN SERPL-MCNC: 14 MG/DL (ref 7–20)
CALCIUM SERPL-MCNC: 9.6 MG/DL (ref 8.3–10.6)
CHLORIDE SERPL-SCNC: 105 MMOL/L (ref 99–110)
CO2 SERPL-SCNC: 26 MMOL/L (ref 21–32)
CREAT SERPL-MCNC: 0.9 MG/DL (ref 0.9–1.3)
ERYTHROCYTE [DISTWIDTH] IN BLOOD BY AUTOMATED COUNT: 12 % (ref 11.7–14.9)
GFR, ESTIMATED: >90 ML/MIN/1.73M2
GLUCOSE SERPL-MCNC: 87 MG/DL (ref 74–99)
HCT VFR BLD AUTO: 42.6 % (ref 42–52)
HGB BLD-MCNC: 15 G/DL (ref 13.5–18)
INR PPP: 0.9
MAGNESIUM SERPL-MCNC: 2.3 MG/DL (ref 1.8–2.4)
MCH RBC QN AUTO: 31 PG (ref 27–31)
MCHC RBC AUTO-ENTMCNC: 35.2 G/DL (ref 32–36)
MCV RBC AUTO: 88 FL (ref 78–100)
PARTIAL THROMBOPLASTIN TIME: 28.7 SEC (ref 25.1–37.1)
PHOSPHATE SERPL-MCNC: 3.1 MG/DL (ref 2.5–4.9)
PLATELET # BLD AUTO: 162 K/UL (ref 140–440)
PMV BLD AUTO: 11.7 FL (ref 7.5–11.1)
POTASSIUM SERPL-SCNC: 3.8 MMOL/L (ref 3.5–5.1)
PROTHROMBIN TIME: 12.8 SEC (ref 11.7–14.5)
RBC # BLD AUTO: 4.84 M/UL (ref 4.6–6.2)
SODIUM SERPL-SCNC: 142 MMOL/L (ref 136–145)
WBC OTHER # BLD: 5.2 K/UL (ref 4–10.5)

## 2025-06-24 PROCEDURE — 84100 ASSAY OF PHOSPHORUS: CPT

## 2025-06-24 PROCEDURE — 85027 COMPLETE CBC AUTOMATED: CPT

## 2025-06-24 PROCEDURE — 85730 THROMBOPLASTIN TIME PARTIAL: CPT

## 2025-06-24 PROCEDURE — 80048 BASIC METABOLIC PNL TOTAL CA: CPT

## 2025-06-24 PROCEDURE — 83735 ASSAY OF MAGNESIUM: CPT

## 2025-06-24 PROCEDURE — 71046 X-RAY EXAM CHEST 2 VIEWS: CPT

## 2025-06-24 PROCEDURE — 85610 PROTHROMBIN TIME: CPT

## 2025-06-24 RX ORDER — ASPIRIN 81 MG/1
81 TABLET ORAL DAILY
Qty: 30 TABLET | Refills: 1 | Status: SHIPPED | OUTPATIENT
Start: 2025-06-24

## 2025-06-24 NOTE — PROGRESS NOTES
Patient here in office and educated on 6/24/2025, scheduled for EP Study/SVT ablation on 7/2/2025 @ 0700, with arrival @ 0530, @ [facility]; consents signed. Copy of orders given for labs and CXR due 6/24/2025 at [facility]. Instructions given to patient to :NPO after midnight including water the night before procedure. May take rest of morning medications day of procedure except the following; Hold Buspar the morning of the procedure.Take Aspirin 3 days before procedure beginning 6/29/2025. Advised patient to plan for an overnight stay in the hospital. Patient voiced understanding. Copies of consent & info scanned in chart.

## 2025-06-27 DIAGNOSIS — I10 BENIGN ESSENTIAL HTN: ICD-10-CM

## 2025-06-27 RX ORDER — AMLODIPINE BESYLATE 5 MG/1
5 TABLET ORAL DAILY
Qty: 30 TABLET | Refills: 1 | OUTPATIENT
Start: 2025-06-27

## 2025-07-01 ENCOUNTER — TELEPHONE (OUTPATIENT)
Dept: CARDIOLOGY CLINIC | Age: 25
End: 2025-07-01

## 2025-07-01 ENCOUNTER — OFFICE VISIT (OUTPATIENT)
Age: 25
End: 2025-07-01

## 2025-07-01 VITALS
HEART RATE: 68 BPM | BODY MASS INDEX: 24.97 KG/M2 | DIASTOLIC BLOOD PRESSURE: 78 MMHG | OXYGEN SATURATION: 99 % | TEMPERATURE: 98.7 F | RESPIRATION RATE: 16 BRPM | WEIGHT: 174 LBS | SYSTOLIC BLOOD PRESSURE: 132 MMHG

## 2025-07-01 DIAGNOSIS — I49.8 OTHER CARDIAC ARRHYTHMIA: ICD-10-CM

## 2025-07-01 DIAGNOSIS — F41.9 ANXIETY: Primary | ICD-10-CM

## 2025-07-01 DIAGNOSIS — I45.6 WPW SYNDROME: ICD-10-CM

## 2025-07-01 DIAGNOSIS — I10 PRIMARY HYPERTENSION: ICD-10-CM

## 2025-07-01 RX ORDER — BUSPIRONE HYDROCHLORIDE 5 MG/1
5 TABLET ORAL 2 TIMES DAILY
COMMUNITY
End: 2025-07-01 | Stop reason: SDUPTHER

## 2025-07-01 RX ORDER — HYDROXYZINE HYDROCHLORIDE 10 MG/1
10 TABLET, FILM COATED ORAL 2 TIMES DAILY PRN
Qty: 30 TABLET | Refills: 0 | Status: SHIPPED | OUTPATIENT
Start: 2025-07-01

## 2025-07-01 RX ORDER — BUSPIRONE HYDROCHLORIDE 7.5 MG/1
7.5 TABLET ORAL 2 TIMES DAILY
Qty: 60 TABLET | Refills: 2 | Status: SHIPPED | OUTPATIENT
Start: 2025-07-01

## 2025-07-01 NOTE — PROGRESS NOTES
Philippe Goldstein  Patient's  is 2000  Seen in office on 2025      SUBJECTIVE:  Philippe mercado 25 y.o.year old male presents today   Chief Complaint   Patient presents with    Discuss Medications     Buspar    Other     Has had 2 ED visits since seen in last month     History of Present Illness  The patient presents for anxiety, hypertension, and Jessica-Parkinson-White syndrome.    He sought emergency care on 2025 and 06/15/2025 due to episodes of numbness in his hands, arms, and face, accompanied by a heart rate of 200 bpm. These symptoms were suggestive of panic attacks. During one episode, he was driving when he experienced sudden numbness in his face and difficulty speaking. He managed to pull over and call his employer before returning to work where an ambulance was waiting. His blood pressure was slightly elevated, and his heart rate was 115 bpm. An EKG was performed, and he was transported to the ER.    A few days ago, he experienced weakness and numbness in his hands while driving, which subsided after eating. He has been feeling different over the past few weeks and suspects severe anxiety. He has not consulted a psychiatrist but is open to doing so. He continues to take BuSpar 5 mg twice daily but reports worsening symptoms. He has not taken hydroxyzine as prescribed.    He also reports persistent chest pain, which intensifies with arm stretching. He does not engage in heavy lifting.    He has not been taking amlodipine 5 mg due to insurance issues but plans to resume it soon. He took it for 2 to 3 months and found it helpful.    He is scheduled for an EP study tomorrow.    SOCIAL HISTORY  He works as a  and .      Taking medications regularly. No side effects noted.    Review of Systems    OBJECTIVE: /78   Pulse 68   Temp 98.7 °F (37.1 °C) (Oral)   Resp 16   Wt 78.9 kg (174 lb)   SpO2 99%   BMI 24.97 kg/m²     Wt Readings from Last 3 Encounters:

## 2025-07-01 NOTE — TELEPHONE ENCOUNTER
Pt is scheduled for EP study tomorrow 7/2. He is wanting to know if his insurance approved this procedure.

## 2025-07-01 NOTE — TELEPHONE ENCOUNTER
Called patient and advised yes ins approved procedure. Asked patient if he has been taking his ASA patient stated he forgot to till today. Advised patient to take today and tomorrow before procedure. Patient stated understanding.

## 2025-07-02 ENCOUNTER — HOSPITAL ENCOUNTER (OUTPATIENT)
Age: 25
Setting detail: OUTPATIENT SURGERY
Discharge: HOME | End: 2025-07-02
Attending: INTERNAL MEDICINE | Admitting: INTERNAL MEDICINE
Payer: COMMERCIAL

## 2025-07-02 ENCOUNTER — APPOINTMENT (OUTPATIENT)
Dept: NON INVASIVE DIAGNOSTICS | Age: 25
End: 2025-07-02
Attending: INTERNAL MEDICINE
Payer: COMMERCIAL

## 2025-07-02 VITALS
HEART RATE: 77 BPM | BODY MASS INDEX: 25.18 KG/M2 | TEMPERATURE: 97.2 F | OXYGEN SATURATION: 98 % | DIASTOLIC BLOOD PRESSURE: 80 MMHG | SYSTOLIC BLOOD PRESSURE: 130 MMHG | RESPIRATION RATE: 18 BRPM | WEIGHT: 170 LBS | HEIGHT: 69 IN

## 2025-07-02 DIAGNOSIS — R00.2 PALPITATIONS: ICD-10-CM

## 2025-07-02 DIAGNOSIS — I45.6 WPW (WOLFF-PARKINSON-WHITE SYNDROME): ICD-10-CM

## 2025-07-02 DIAGNOSIS — Z98.890 S/P ABLATION OF ACCESSORY BYPASS TRACT: Primary | ICD-10-CM

## 2025-07-02 LAB
ABO + RH BLD: NORMAL
ACTIVATED CLOTTING TIME, LOW RANGE: 144 SEC (ref 89–169)
ACTIVATED CLOTTING TIME, LOW RANGE: 264 SEC (ref 89–169)
ACTIVATED CLOTTING TIME, LOW RANGE: 370 SEC (ref 89–169)
BLOOD BANK SAMPLE EXPIRATION: NORMAL
BLOOD GROUP ANTIBODIES SERPL: NEGATIVE
ECHO BSA: 1.94 M2
ECHO BSA: 1.94 M2
ECHO LV EDV A4C: 65 ML
ECHO LV EDV INDEX A4C: 34 ML/M2
ECHO LV EF PHYSICIAN: 55 %
ECHO LV EJECTION FRACTION A4C: 47 %
ECHO LV ESV A4C: 35 ML
ECHO LV ESV INDEX A4C: 18 ML/M2

## 2025-07-02 PROCEDURE — 86900 BLOOD TYPING SEROLOGIC ABO: CPT

## 2025-07-02 PROCEDURE — 86901 BLOOD TYPING SEROLOGIC RH(D): CPT

## 2025-07-02 PROCEDURE — 93662 INTRACARDIAC ECG (ICE): CPT | Performed by: INTERNAL MEDICINE

## 2025-07-02 PROCEDURE — 93308 TTE F-UP OR LMTD: CPT

## 2025-07-02 PROCEDURE — 2720000010 HC SURG SUPPLY STERILE: Performed by: INTERNAL MEDICINE

## 2025-07-02 PROCEDURE — 2500000003 HC RX 250 WO HCPCS

## 2025-07-02 PROCEDURE — 99153 MOD SED SAME PHYS/QHP EA: CPT | Performed by: INTERNAL MEDICINE

## 2025-07-02 PROCEDURE — 85347 COAGULATION TIME ACTIVATED: CPT

## 2025-07-02 PROCEDURE — C1766 INTRO/SHEATH,STRBLE,NON-PEEL: HCPCS | Performed by: INTERNAL MEDICINE

## 2025-07-02 PROCEDURE — 93308 TTE F-UP OR LMTD: CPT | Performed by: INTERNAL MEDICINE

## 2025-07-02 PROCEDURE — C1733 CATH, EP, OTHR THAN COOL-TIP: HCPCS | Performed by: INTERNAL MEDICINE

## 2025-07-02 PROCEDURE — 93325 DOPPLER ECHO COLOR FLOW MAPG: CPT | Performed by: INTERNAL MEDICINE

## 2025-07-02 PROCEDURE — 6360000002 HC RX W HCPCS

## 2025-07-02 PROCEDURE — C1769 GUIDE WIRE: HCPCS | Performed by: INTERNAL MEDICINE

## 2025-07-02 PROCEDURE — 6360000002 HC RX W HCPCS: Performed by: INTERNAL MEDICINE

## 2025-07-02 PROCEDURE — C1759 CATH, INTRA ECHOCARDIOGRAPHY: HCPCS | Performed by: INTERNAL MEDICINE

## 2025-07-02 PROCEDURE — 2580000003 HC RX 258

## 2025-07-02 PROCEDURE — 86850 RBC ANTIBODY SCREEN: CPT

## 2025-07-02 PROCEDURE — 93653 COMPRE EP EVAL TX SVT: CPT | Performed by: INTERNAL MEDICINE

## 2025-07-02 PROCEDURE — 93321 DOPPLER ECHO F-UP/LMTD STD: CPT | Performed by: INTERNAL MEDICINE

## 2025-07-02 PROCEDURE — 93623 PRGRMD STIMJ&PACG IV RX NFS: CPT | Performed by: INTERNAL MEDICINE

## 2025-07-02 PROCEDURE — C1893 INTRO/SHEATH, FIXED,NON-PEEL: HCPCS | Performed by: INTERNAL MEDICINE

## 2025-07-02 PROCEDURE — C1894 INTRO/SHEATH, NON-LASER: HCPCS | Performed by: INTERNAL MEDICINE

## 2025-07-02 PROCEDURE — C1730 CATH, EP, 19 OR FEW ELECT: HCPCS | Performed by: INTERNAL MEDICINE

## 2025-07-02 PROCEDURE — C1732 CATH, EP, DIAG/ABL, 3D/VECT: HCPCS | Performed by: INTERNAL MEDICINE

## 2025-07-02 PROCEDURE — 2709999900 HC NON-CHARGEABLE SUPPLY: Performed by: INTERNAL MEDICINE

## 2025-07-02 PROCEDURE — 93462 L HRT CATH TRNSPTL PUNCTURE: CPT | Performed by: INTERNAL MEDICINE

## 2025-07-02 PROCEDURE — 7100000010 HC PHASE II RECOVERY - FIRST 15 MIN: Performed by: INTERNAL MEDICINE

## 2025-07-02 PROCEDURE — 2580000003 HC RX 258: Performed by: INTERNAL MEDICINE

## 2025-07-02 PROCEDURE — 7100000011 HC PHASE II RECOVERY - ADDTL 15 MIN: Performed by: INTERNAL MEDICINE

## 2025-07-02 PROCEDURE — 99152 MOD SED SAME PHYS/QHP 5/>YRS: CPT | Performed by: INTERNAL MEDICINE

## 2025-07-02 RX ORDER — SODIUM CHLORIDE 9 MG/ML
INJECTION, SOLUTION INTRAVENOUS CONTINUOUS PRN
Status: COMPLETED | OUTPATIENT
Start: 2025-07-02 | End: 2025-07-02

## 2025-07-02 RX ORDER — ADENOSINE 3 MG/ML
INJECTION, SOLUTION INTRAVENOUS PRN
Status: DISCONTINUED | OUTPATIENT
Start: 2025-07-02 | End: 2025-07-02 | Stop reason: HOSPADM

## 2025-07-02 RX ORDER — FENTANYL CITRATE 50 UG/ML
INJECTION, SOLUTION INTRAMUSCULAR; INTRAVENOUS PRN
Status: DISCONTINUED | OUTPATIENT
Start: 2025-07-02 | End: 2025-07-02 | Stop reason: HOSPADM

## 2025-07-02 RX ORDER — HEPARIN SODIUM 10000 [USP'U]/ML
INJECTION, SOLUTION INTRAVENOUS; SUBCUTANEOUS PRN
Status: DISCONTINUED | OUTPATIENT
Start: 2025-07-02 | End: 2025-07-02 | Stop reason: HOSPADM

## 2025-07-02 RX ORDER — PROTAMINE SULFATE 10 MG/ML
INJECTION, SOLUTION INTRAVENOUS PRN
Status: DISCONTINUED | OUTPATIENT
Start: 2025-07-02 | End: 2025-07-02 | Stop reason: HOSPADM

## 2025-07-02 RX ORDER — MIDAZOLAM HYDROCHLORIDE 1 MG/ML
INJECTION, SOLUTION INTRAMUSCULAR; INTRAVENOUS PRN
Status: DISCONTINUED | OUTPATIENT
Start: 2025-07-02 | End: 2025-07-02 | Stop reason: HOSPADM

## 2025-07-02 RX ADMIN — DEXTROSE 150 MG: 50 INJECTION, SOLUTION INTRAVENOUS at 08:55

## 2025-07-02 ASSESSMENT — ENCOUNTER SYMPTOMS
COUGH: 0
WHEEZING: 0
ABDOMINAL PAIN: 0
EYE PAIN: 0
SHORTNESS OF BREATH: 0
BACK PAIN: 0
BLOOD IN STOOL: 0
NAUSEA: 0
VOMITING: 0
CHEST TIGHTNESS: 0
PHOTOPHOBIA: 0
DIARRHEA: 0
COLOR CHANGE: 0
CONSTIPATION: 0

## 2025-07-02 NOTE — DISCHARGE INSTRUCTIONS
Discharge Home Instuctions  Name:Philippe Goldstein  :2000    Your instructions:    Shower only for the first 5 days, NO TUB BATHS.      Wash procedure site with mild soap, rinse and pat dry.  Do not rub over the procedure site for two (2) days.  Remove dressing and replace with a band-aid in 2 days.    Site observations-Observe the area for bleeding or hematoma (lump under the skin caused by bleeding.)      A.  Painless bruising is normal.  B.  If a firmness/swelling seems to be increasing or there is bright red bleeding from site       (hold pressure over procedure site) and  CALL 911 IMMEDIATELY.    What to do after you leave the hospital:    Recommended activity: no lifting, Driving, or Strenuous exercise for 3 days.  DO NOT lift more than 10lbs.    For your procedure you may have been given a sedative to help you relax. This drug will make you sleepy. It is usually given in a vein (by IV).  Don't do anything for 24 hours that requires attention to detail. It takes time for the medicine effects to completely wear off.  Do not sign any legally binding contracts or documents over the next 24 hours.  For your safety, you should not drive or operate any machinery that could be dangerous until the medicine wears off and you can think clearly and react easily.    Follow-up with physician in 7-10 days.    Take your medication as ordered.      If you have any questions, you may call [phone] or your physicians office

## 2025-07-03 ENCOUNTER — TELEPHONE (OUTPATIENT)
Dept: CARDIOLOGY CLINIC | Age: 25
End: 2025-07-03

## 2025-07-03 NOTE — TELEPHONE ENCOUNTER
Spoke with Patient and scheduled office visit follow up with Amrita Rolle NP on 7/11/25 @ 1100am.    Patient advised understanding.

## 2025-07-11 ENCOUNTER — OFFICE VISIT (OUTPATIENT)
Age: 25
End: 2025-07-11
Payer: COMMERCIAL

## 2025-07-11 VITALS
BODY MASS INDEX: 25.92 KG/M2 | HEIGHT: 69 IN | SYSTOLIC BLOOD PRESSURE: 116 MMHG | WEIGHT: 175 LBS | DIASTOLIC BLOOD PRESSURE: 82 MMHG | HEART RATE: 74 BPM

## 2025-07-11 DIAGNOSIS — Z98.890 HISTORY OF RADIOFREQUENCY ABLATION (RFA) PROCEDURE FOR CARDIAC ARRHYTHMIA: Primary | ICD-10-CM

## 2025-07-11 DIAGNOSIS — I49.9 CARDIAC ARRHYTHMIA, UNSPECIFIED CARDIAC ARRHYTHMIA TYPE: ICD-10-CM

## 2025-07-11 DIAGNOSIS — R00.2 PALPITATIONS: ICD-10-CM

## 2025-07-11 DIAGNOSIS — I10 PRIMARY HYPERTENSION: ICD-10-CM

## 2025-07-11 DIAGNOSIS — I47.19: ICD-10-CM

## 2025-07-11 PROCEDURE — 3074F SYST BP LT 130 MM HG: CPT

## 2025-07-11 PROCEDURE — 99214 OFFICE O/P EST MOD 30 MIN: CPT

## 2025-07-11 PROCEDURE — 93000 ELECTROCARDIOGRAM COMPLETE: CPT

## 2025-07-11 PROCEDURE — 3079F DIAST BP 80-89 MM HG: CPT

## 2025-07-11 ASSESSMENT — ENCOUNTER SYMPTOMS
PHOTOPHOBIA: 0
WHEEZING: 0
DIARRHEA: 0
COLOR CHANGE: 1
NAUSEA: 0
BLOOD IN STOOL: 0
CONSTIPATION: 0
VOMITING: 0
EYE PAIN: 0
ABDOMINAL PAIN: 0
BACK PAIN: 0
COUGH: 0
CHEST TIGHTNESS: 0
SHORTNESS OF BREATH: 0

## 2025-07-11 NOTE — PROGRESS NOTES
daily, aspirin 81 mg daily.  Patient reports compliance with medication regimen.    Patient reports that groin sites have healed well with minimal bruising, minimal tenderness.  No reports of palpitations, chest pain, chest pressure, dizziness, lightheadedness since the procedure.  Patient does state that in new situations, such as starting a new job, he tends to have episodes of numbness in hands and flushing of face, thinks it is related to anxiety.  Does not feel that buspirone is helping, makes him dizzy when he takes it so he only takes it once daily in the morning and then goes back to sleep.  Did not feel that hydroxyzine was helpful either.  Encouraged him to speak with PCP or consider counseling.  Would plan to follow-up with patient in 1 month  Continue to recommend avoiding caffeine and alcohol    Thanks again for allowing me to participate in care of this patient. Please call me if you have any questions.    With best regards.      Amrita Rolle, JOSEPHINE - CNP, 7/11/2025 2:39 PM     The above note is prepared with the intention to serve as communication with trained medical care practitioners only. Some of the information is provided by secondary sources as well as from our patient and/or family member(s) recollection, thus inaccuracies may occur. In addition, other professionals integrate data into the electronic medical record, and the [facility] MD and NPs are not responsible for these. Any errors will be corrected upon verification with documented reports.     Please note this report has been partially produced using speech recognition software and may contain errors related to that system including errors in grammar, punctuation, and spelling, as well as words and phrases that may be inappropriate. If there are any questions or concerns please feel free to contact the dictating provider for clarification.

## 2025-08-12 ENCOUNTER — OFFICE VISIT (OUTPATIENT)
Dept: FAMILY MEDICINE CLINIC | Age: 25
End: 2025-08-12

## 2025-08-12 VITALS
WEIGHT: 180 LBS | RESPIRATION RATE: 16 BRPM | SYSTOLIC BLOOD PRESSURE: 160 MMHG | OXYGEN SATURATION: 98 % | BODY MASS INDEX: 25.77 KG/M2 | HEART RATE: 64 BPM | DIASTOLIC BLOOD PRESSURE: 98 MMHG | HEIGHT: 70 IN

## 2025-08-12 DIAGNOSIS — Z76.89 ENCOUNTER TO ESTABLISH CARE WITH NEW DOCTOR: Primary | ICD-10-CM

## 2025-08-12 DIAGNOSIS — I10 BENIGN ESSENTIAL HTN: ICD-10-CM

## 2025-08-12 DIAGNOSIS — R00.2 PALPITATIONS: ICD-10-CM

## 2025-08-12 DIAGNOSIS — F41.0 PANIC DISORDER: ICD-10-CM

## 2025-08-12 DIAGNOSIS — Z98.890 HISTORY OF RADIOFREQUENCY ABLATION (RFA) PROCEDURE FOR CARDIAC ARRHYTHMIA: ICD-10-CM

## 2025-08-12 DIAGNOSIS — I49.9 CARDIAC ARRHYTHMIA, UNSPECIFIED CARDIAC ARRHYTHMIA TYPE: ICD-10-CM

## 2025-08-12 DIAGNOSIS — I45.6 WPW SYNDROME: ICD-10-CM

## 2025-08-12 PROCEDURE — 99214 OFFICE O/P EST MOD 30 MIN: CPT | Performed by: PHYSICIAN ASSISTANT

## 2025-08-12 PROCEDURE — G2211 COMPLEX E/M VISIT ADD ON: HCPCS | Performed by: PHYSICIAN ASSISTANT

## 2025-08-12 PROCEDURE — 3077F SYST BP >= 140 MM HG: CPT | Performed by: PHYSICIAN ASSISTANT

## 2025-08-12 PROCEDURE — 36415 COLL VENOUS BLD VENIPUNCTURE: CPT | Performed by: PHYSICIAN ASSISTANT

## 2025-08-12 PROCEDURE — 3080F DIAST BP >= 90 MM HG: CPT | Performed by: PHYSICIAN ASSISTANT

## 2025-08-12 RX ORDER — AMLODIPINE BESYLATE 5 MG/1
5 TABLET ORAL DAILY
Qty: 30 TABLET | Refills: 5 | Status: SHIPPED | OUTPATIENT
Start: 2025-08-12

## 2025-08-12 ASSESSMENT — PATIENT HEALTH QUESTIONNAIRE - PHQ9
8. MOVING OR SPEAKING SO SLOWLY THAT OTHER PEOPLE COULD HAVE NOTICED. OR THE OPPOSITE, BEING SO FIGETY OR RESTLESS THAT YOU HAVE BEEN MOVING AROUND A LOT MORE THAN USUAL: NOT AT ALL
SUM OF ALL RESPONSES TO PHQ QUESTIONS 1-9: 17
1. LITTLE INTEREST OR PLEASURE IN DOING THINGS: NEARLY EVERY DAY
10. IF YOU CHECKED OFF ANY PROBLEMS, HOW DIFFICULT HAVE THESE PROBLEMS MADE IT FOR YOU TO DO YOUR WORK, TAKE CARE OF THINGS AT HOME, OR GET ALONG WITH OTHER PEOPLE: SOMEWHAT DIFFICULT
SUM OF ALL RESPONSES TO PHQ QUESTIONS 1-9: 17
6. FEELING BAD ABOUT YOURSELF - OR THAT YOU ARE A FAILURE OR HAVE LET YOURSELF OR YOUR FAMILY DOWN: NEARLY EVERY DAY
7. TROUBLE CONCENTRATING ON THINGS, SUCH AS READING THE NEWSPAPER OR WATCHING TELEVISION: NOT AT ALL
SUM OF ALL RESPONSES TO PHQ QUESTIONS 1-9: 17
5. POOR APPETITE OR OVEREATING: NEARLY EVERY DAY
4. FEELING TIRED OR HAVING LITTLE ENERGY: MORE THAN HALF THE DAYS
SUM OF ALL RESPONSES TO PHQ QUESTIONS 1-9: 17
2. FEELING DOWN, DEPRESSED OR HOPELESS: NEARLY EVERY DAY
9. THOUGHTS THAT YOU WOULD BE BETTER OFF DEAD, OR OF HURTING YOURSELF: NOT AT ALL
3. TROUBLE FALLING OR STAYING ASLEEP: NEARLY EVERY DAY

## 2025-08-13 LAB
ALBUMIN SERPL-MCNC: 4.9 G/DL (ref 3.4–5)
ALBUMIN/GLOB SERPL: 2 {RATIO} (ref 1.1–2.2)
ALP SERPL-CCNC: 65 U/L (ref 40–129)
ALT SERPL-CCNC: 19 U/L (ref 10–40)
ANION GAP SERPL CALCULATED.3IONS-SCNC: 12 MMOL/L (ref 3–16)
AST SERPL-CCNC: 19 U/L (ref 15–37)
BASOPHILS # BLD: 0.1 K/UL (ref 0–0.2)
BASOPHILS NFR BLD: 0.8 %
BILIRUB SERPL-MCNC: 0.6 MG/DL (ref 0–1)
BUN SERPL-MCNC: 9 MG/DL (ref 7–20)
CALCIUM SERPL-MCNC: 9.6 MG/DL (ref 8.3–10.6)
CHLORIDE SERPL-SCNC: 102 MMOL/L (ref 99–110)
CO2 SERPL-SCNC: 26 MMOL/L (ref 21–32)
CREAT SERPL-MCNC: 0.8 MG/DL (ref 0.9–1.3)
DEPRECATED RDW RBC AUTO: 16.1 % (ref 12.4–15.4)
EOSINOPHIL # BLD: 0.1 K/UL (ref 0–0.6)
EOSINOPHIL NFR BLD: 0.8 %
GFR SERPLBLD CREATININE-BSD FMLA CKD-EPI: >90 ML/MIN/{1.73_M2}
GLUCOSE SERPL-MCNC: 90 MG/DL (ref 70–99)
HCT VFR BLD AUTO: 46.5 % (ref 40.5–52.5)
HGB BLD-MCNC: 16.4 G/DL (ref 13.5–17.5)
LYMPHOCYTES # BLD: 1.2 K/UL (ref 1–5.1)
LYMPHOCYTES NFR BLD: 17.8 %
MCH RBC QN AUTO: 32.9 PG (ref 26–34)
MCHC RBC AUTO-ENTMCNC: 35.3 G/DL (ref 31–36)
MCV RBC AUTO: 93.3 FL (ref 80–100)
MONOCYTES # BLD: 0.6 K/UL (ref 0–1.3)
MONOCYTES NFR BLD: 8.5 %
NEUTROPHILS # BLD: 5 K/UL (ref 1.7–7.7)
NEUTROPHILS NFR BLD: 72.1 %
PLATELET # BLD AUTO: 154 K/UL (ref 135–450)
PMV BLD AUTO: 10.2 FL (ref 5–10.5)
POTASSIUM SERPL-SCNC: 4.3 MMOL/L (ref 3.5–5.1)
PROT SERPL-MCNC: 7.3 G/DL (ref 6.4–8.2)
RBC # BLD AUTO: 4.98 M/UL (ref 4.2–5.9)
SODIUM SERPL-SCNC: 140 MMOL/L (ref 136–145)
WBC # BLD AUTO: 6.9 K/UL (ref 4–11)

## 2025-08-15 ENCOUNTER — OFFICE VISIT (OUTPATIENT)
Age: 25
End: 2025-08-15

## 2025-08-15 VITALS
SYSTOLIC BLOOD PRESSURE: 140 MMHG | HEIGHT: 70 IN | DIASTOLIC BLOOD PRESSURE: 78 MMHG | WEIGHT: 183.2 LBS | HEART RATE: 62 BPM | BODY MASS INDEX: 26.23 KG/M2

## 2025-08-15 DIAGNOSIS — I49.9 CARDIAC ARRHYTHMIA, UNSPECIFIED CARDIAC ARRHYTHMIA TYPE: ICD-10-CM

## 2025-08-15 DIAGNOSIS — R00.2 PALPITATIONS: Primary | ICD-10-CM

## 2025-08-15 DIAGNOSIS — I10 PRIMARY HYPERTENSION: ICD-10-CM

## 2025-08-15 DIAGNOSIS — F41.9 ANXIETY: ICD-10-CM

## 2025-08-15 DIAGNOSIS — Z98.890 HISTORY OF RADIOFREQUENCY ABLATION (RFA) PROCEDURE FOR CARDIAC ARRHYTHMIA: ICD-10-CM

## 2025-08-15 PROCEDURE — 3077F SYST BP >= 140 MM HG: CPT

## 2025-08-15 PROCEDURE — 99214 OFFICE O/P EST MOD 30 MIN: CPT

## 2025-08-15 PROCEDURE — 3078F DIAST BP <80 MM HG: CPT

## 2025-08-15 PROCEDURE — 93000 ELECTROCARDIOGRAM COMPLETE: CPT

## 2025-08-15 ASSESSMENT — ENCOUNTER SYMPTOMS
CHEST TIGHTNESS: 0
WHEEZING: 0
BACK PAIN: 0
PHOTOPHOBIA: 0
COUGH: 0
ABDOMINAL PAIN: 0
COLOR CHANGE: 1
CONSTIPATION: 0
SHORTNESS OF BREATH: 0
EYE PAIN: 0
BLOOD IN STOOL: 0
VOMITING: 0
NAUSEA: 0
DIARRHEA: 0

## (undated) DEVICE — CATHETER EP 6FR L110CM 2-5-2MM SPC 4 ELECTRD A CRV NYL

## (undated) DEVICE — TUBING PMP FOR CARTO SYS SMARTABLATE

## (undated) DEVICE — 1 X VERSACROSS TRANSSEPTAL SHEATH (INCLUDING  1 X J-TIP GUIDEWIRE); 1 X VERSACROSS RF WIRE (INCLUDING 1 X CONNECTOR CABLE (SINGLE USE)); 1 X DISPERSIVE ELECTRODE: Brand: VERSACROSS ACCESS SOLUTION

## (undated) DEVICE — INTRODUCER SHTH 6FR L12CM DIA0.038IN STD HEMSTAS CLOSE TOL

## (undated) DEVICE — Device

## (undated) DEVICE — SHEATH INTRO 9FR L12CM GWIRE L150CM DIA0.038IN STD HEMSTAS

## (undated) DEVICE — CATHETER EP 6FR L115CM 2-8-2MM SPC TIP 2MM 10 ELECTRD CSD

## (undated) DEVICE — CATHETER DIAG 5FR 4 POLE HIS CRV WEBST

## (undated) DEVICE — INTRODUCER SHTH 8FR L12CM DIA0.038IN STD HEMSTAS CLOSE TOL

## (undated) DEVICE — PATCH REF EXT FOR CARTO 3 SYS (EA = 6 PACKS)

## (undated) DEVICE — CATHETER ABLAT 8FR L115CM 1-6-2MM SPC TIP 3.5MM DF CRV

## (undated) DEVICE — PERCUTANEOUS ENTRY THINWALL NEEDLE  ONE-PART: Brand: COOK

## (undated) DEVICE — SHEATH GUID 11.5X8.5FR L71MM M CRV L22MM BIDIR STEER CARTO

## (undated) DEVICE — CATHETER MAP 7FR L115CM 2-6-2 SPC D CRV 22 ELECTRD PENTARAY

## (undated) DEVICE — SHEATH INTRO 7FR L12CM GWIRE L150CM DIA0.038IN STD HEMSTAS

## (undated) DEVICE — CATHETER US 8FR L90CM GRN TIP OVERLAY FOR GE-VIVID I VIVID

## (undated) DEVICE — GUIDEWIRE VASC L180CM DIA0.035IN L7CM DIA3MM J TIP PTFE S